# Patient Record
Sex: FEMALE | Race: WHITE | NOT HISPANIC OR LATINO | Employment: FULL TIME | URBAN - METROPOLITAN AREA
[De-identification: names, ages, dates, MRNs, and addresses within clinical notes are randomized per-mention and may not be internally consistent; named-entity substitution may affect disease eponyms.]

---

## 2017-04-25 ENCOUNTER — HOSPITAL ENCOUNTER (EMERGENCY)
Facility: HOSPITAL | Age: 47
Discharge: HOME | End: 2017-04-25
Attending: EMERGENCY MEDICINE
Payer: COMMERCIAL

## 2017-04-25 ENCOUNTER — APPOINTMENT (EMERGENCY)
Dept: CT IMAGING | Facility: HOSPITAL | Age: 47
End: 2017-04-25
Payer: COMMERCIAL

## 2017-04-25 ENCOUNTER — APPOINTMENT (EMERGENCY)
Dept: ULTRASOUND IMAGING | Facility: HOSPITAL | Age: 47
End: 2017-04-25
Payer: COMMERCIAL

## 2017-04-25 VITALS
RESPIRATION RATE: 18 BRPM | WEIGHT: 148 LBS | OXYGEN SATURATION: 97 % | SYSTOLIC BLOOD PRESSURE: 119 MMHG | TEMPERATURE: 97.6 F | DIASTOLIC BLOOD PRESSURE: 59 MMHG | HEART RATE: 45 BPM

## 2017-04-25 DIAGNOSIS — N13.30 HYDROURETERONEPHROSIS: Primary | ICD-10-CM

## 2017-04-25 LAB
ALBUMIN SERPL BCP-MCNC: 3.7 G/DL (ref 3.5–5)
ALP SERPL-CCNC: 64 U/L (ref 46–116)
ALT SERPL W P-5'-P-CCNC: 19 U/L (ref 12–78)
ANION GAP SERPL CALCULATED.3IONS-SCNC: 9 MMOL/L (ref 4–13)
AST SERPL W P-5'-P-CCNC: 19 U/L (ref 5–45)
BASOPHILS # BLD AUTO: 0.1 THOUSANDS/ΜL (ref 0–0.1)
BASOPHILS NFR BLD AUTO: 2 % (ref 0–1)
BILIRUB SERPL-MCNC: 0.2 MG/DL (ref 0.2–1)
BUN SERPL-MCNC: 17 MG/DL (ref 5–25)
CALCIUM SERPL-MCNC: 9 MG/DL (ref 8.3–10.1)
CHLORIDE SERPL-SCNC: 105 MMOL/L (ref 100–108)
CLARITY, POC: CLEAR
CO2 SERPL-SCNC: 25 MMOL/L (ref 21–32)
COLOR, POC: YELLOW
CREAT SERPL-MCNC: 0.97 MG/DL (ref 0.6–1.3)
EOSINOPHIL # BLD AUTO: 0.39 THOUSAND/ΜL (ref 0–0.61)
EOSINOPHIL NFR BLD AUTO: 6 % (ref 0–6)
ERYTHROCYTE [DISTWIDTH] IN BLOOD BY AUTOMATED COUNT: 12.3 % (ref 11.6–15.1)
EXT BILIRUBIN, UA: NEGATIVE
EXT BLOOD URINE: NEGATIVE
EXT GLUCOSE, UA: NEGATIVE
EXT KETONES: NEGATIVE
EXT NITRITE, UA: NEGATIVE
EXT PH, UA: 6
EXT PROTEIN, UA: NEGATIVE
EXT SPECIFIC GRAVITY, UA: 1.01
EXT UROBILINOGEN: NEGATIVE
GFR SERPL CREATININE-BSD FRML MDRD: >60 ML/MIN/1.73SQ M
GLUCOSE SERPL-MCNC: 96 MG/DL (ref 65–140)
HCG UR QL: NEGATIVE
HCT VFR BLD AUTO: 37.7 % (ref 34.8–46.1)
HGB BLD-MCNC: 12.5 G/DL (ref 11.5–15.4)
LIPASE SERPL-CCNC: 133 U/L (ref 73–393)
LYMPHOCYTES # BLD AUTO: 2.23 THOUSANDS/ΜL (ref 0.6–4.47)
LYMPHOCYTES NFR BLD AUTO: 36 % (ref 14–44)
MCH RBC QN AUTO: 30.9 PG (ref 26.8–34.3)
MCHC RBC AUTO-ENTMCNC: 33.2 G/DL (ref 31.4–37.4)
MCV RBC AUTO: 93 FL (ref 82–98)
MONOCYTES # BLD AUTO: 0.39 THOUSAND/ΜL (ref 0.17–1.22)
MONOCYTES NFR BLD AUTO: 6 % (ref 4–12)
NEUTROPHILS # BLD AUTO: 3.18 THOUSANDS/ΜL (ref 1.85–7.62)
NEUTS SEG NFR BLD AUTO: 50 % (ref 43–75)
PLATELET # BLD AUTO: 179 THOUSANDS/UL (ref 149–390)
PMV BLD AUTO: 11 FL (ref 8.9–12.7)
POTASSIUM SERPL-SCNC: 3.8 MMOL/L (ref 3.5–5.3)
PROT SERPL-MCNC: 6.8 G/DL (ref 6.4–8.2)
RBC # BLD AUTO: 4.05 MILLION/UL (ref 3.81–5.12)
SODIUM SERPL-SCNC: 139 MMOL/L (ref 136–145)
WBC # BLD AUTO: 6.29 THOUSAND/UL (ref 4.31–10.16)
WBC # BLD EST: NEGATIVE 10*3/UL

## 2017-04-25 PROCEDURE — 76856 US EXAM PELVIC COMPLETE: CPT

## 2017-04-25 PROCEDURE — 80053 COMPREHEN METABOLIC PANEL: CPT | Performed by: PHYSICIAN ASSISTANT

## 2017-04-25 PROCEDURE — 85025 COMPLETE CBC W/AUTO DIFF WBC: CPT | Performed by: PHYSICIAN ASSISTANT

## 2017-04-25 PROCEDURE — 96374 THER/PROPH/DIAG INJ IV PUSH: CPT

## 2017-04-25 PROCEDURE — 83690 ASSAY OF LIPASE: CPT | Performed by: PHYSICIAN ASSISTANT

## 2017-04-25 PROCEDURE — 74177 CT ABD & PELVIS W/CONTRAST: CPT

## 2017-04-25 PROCEDURE — 81025 URINE PREGNANCY TEST: CPT | Performed by: PHYSICIAN ASSISTANT

## 2017-04-25 PROCEDURE — 99284 EMERGENCY DEPT VISIT MOD MDM: CPT

## 2017-04-25 PROCEDURE — 76830 TRANSVAGINAL US NON-OB: CPT

## 2017-04-25 PROCEDURE — 96375 TX/PRO/DX INJ NEW DRUG ADDON: CPT

## 2017-04-25 PROCEDURE — 36415 COLL VENOUS BLD VENIPUNCTURE: CPT | Performed by: PHYSICIAN ASSISTANT

## 2017-04-25 PROCEDURE — 81002 URINALYSIS NONAUTO W/O SCOPE: CPT | Performed by: PHYSICIAN ASSISTANT

## 2017-04-25 PROCEDURE — 96361 HYDRATE IV INFUSION ADD-ON: CPT

## 2017-04-25 RX ORDER — DICYCLOMINE HCL 20 MG
20 TABLET ORAL ONCE
Status: DISCONTINUED | OUTPATIENT
Start: 2017-04-25 | End: 2017-04-25

## 2017-04-25 RX ORDER — KETOROLAC TROMETHAMINE 30 MG/ML
30 INJECTION, SOLUTION INTRAMUSCULAR; INTRAVENOUS ONCE
Status: COMPLETED | OUTPATIENT
Start: 2017-04-25 | End: 2017-04-25

## 2017-04-25 RX ORDER — DICYCLOMINE HCL 20 MG
20 TABLET ORAL ONCE
Status: COMPLETED | OUTPATIENT
Start: 2017-04-25 | End: 2017-04-25

## 2017-04-25 RX ORDER — ONDANSETRON 2 MG/ML
4 INJECTION INTRAMUSCULAR; INTRAVENOUS ONCE
Status: COMPLETED | OUTPATIENT
Start: 2017-04-25 | End: 2017-04-25

## 2017-04-25 RX ORDER — NAPROXEN 500 MG/1
500 TABLET ORAL 2 TIMES DAILY WITH MEALS
Qty: 5 TABLET | Refills: 0 | Status: SHIPPED | OUTPATIENT
Start: 2017-04-25 | End: 2017-05-08 | Stop reason: ALTCHOICE

## 2017-04-25 RX ORDER — HYDROCODONE BITARTRATE AND ACETAMINOPHEN 5; 325 MG/1; MG/1
1 TABLET ORAL EVERY 6 HOURS PRN
Qty: 12 TABLET | Refills: 0 | Status: SHIPPED | OUTPATIENT
Start: 2017-04-25 | End: 2017-04-28

## 2017-04-25 RX ADMIN — DICYCLOMINE HYDROCHLORIDE 20 MG: 20 TABLET ORAL at 10:48

## 2017-04-25 RX ADMIN — ONDANSETRON 4 MG: 2 INJECTION INTRAMUSCULAR; INTRAVENOUS at 10:44

## 2017-04-25 RX ADMIN — KETOROLAC TROMETHAMINE 30 MG: 30 INJECTION, SOLUTION INTRAMUSCULAR at 10:47

## 2017-04-25 RX ADMIN — SODIUM CHLORIDE 1000 ML: 0.9 INJECTION, SOLUTION INTRAVENOUS at 12:00

## 2017-04-25 RX ADMIN — IOHEXOL 100 ML: 350 INJECTION, SOLUTION INTRAVENOUS at 12:24

## 2017-04-28 ENCOUNTER — ALLSCRIPTS OFFICE VISIT (OUTPATIENT)
Dept: OTHER | Facility: OTHER | Age: 47
End: 2017-04-28

## 2017-04-28 LAB
CLARITY UR: NORMAL
COLOR UR: YELLOW
GLUCOSE (HISTORICAL): NORMAL
HGB UR QL STRIP.AUTO: NORMAL
KETONES UR STRIP-MCNC: NORMAL MG/DL
LEUKOCYTE ESTERASE UR QL STRIP: NORMAL
NITRITE UR QL STRIP: NORMAL
PH UR STRIP.AUTO: 6 [PH]
PROT UR STRIP-MCNC: NORMAL MG/DL
SP GR UR STRIP.AUTO: 1.01

## 2017-05-03 ENCOUNTER — GENERIC CONVERSION - ENCOUNTER (OUTPATIENT)
Dept: OTHER | Facility: OTHER | Age: 47
End: 2017-05-03

## 2017-05-09 ENCOUNTER — ANESTHESIA (OUTPATIENT)
Dept: PERIOP | Facility: HOSPITAL | Age: 47
End: 2017-05-09
Payer: COMMERCIAL

## 2017-05-09 ENCOUNTER — HOSPITAL ENCOUNTER (OUTPATIENT)
Dept: RADIOLOGY | Facility: HOSPITAL | Age: 47
Setting detail: OUTPATIENT SURGERY
Discharge: HOME | End: 2017-05-09
Payer: COMMERCIAL

## 2017-05-09 ENCOUNTER — ANESTHESIA EVENT (OUTPATIENT)
Dept: PERIOP | Facility: HOSPITAL | Age: 47
End: 2017-05-09
Payer: COMMERCIAL

## 2017-05-09 ENCOUNTER — HOSPITAL ENCOUNTER (OUTPATIENT)
Facility: HOSPITAL | Age: 47
Setting detail: OUTPATIENT SURGERY
Discharge: HOME | End: 2017-05-09
Attending: UROLOGY | Admitting: UROLOGY
Payer: COMMERCIAL

## 2017-05-09 ENCOUNTER — APPOINTMENT (OUTPATIENT)
Dept: RADIOLOGY | Facility: HOSPITAL | Age: 47
End: 2017-05-09
Payer: COMMERCIAL

## 2017-05-09 VITALS
OXYGEN SATURATION: 99 % | BODY MASS INDEX: 24.66 KG/M2 | RESPIRATION RATE: 16 BRPM | DIASTOLIC BLOOD PRESSURE: 64 MMHG | HEART RATE: 62 BPM | HEIGHT: 65 IN | WEIGHT: 148 LBS | TEMPERATURE: 98.8 F | SYSTOLIC BLOOD PRESSURE: 109 MMHG

## 2017-05-09 DIAGNOSIS — N20.0 RENAL CALCULUS: ICD-10-CM

## 2017-05-09 PROCEDURE — C1769 GUIDE WIRE: HCPCS | Performed by: UROLOGY

## 2017-05-09 PROCEDURE — 74420 UROGRAPHY RTRGR +-KUB: CPT

## 2017-05-09 PROCEDURE — C2617 STENT, NON-COR, TEM W/O DEL: HCPCS | Performed by: UROLOGY

## 2017-05-09 DEVICE — STENT URETERAL 6 FR 26CM INLAY OPTIMA: Type: IMPLANTABLE DEVICE | Site: URETER | Status: FUNCTIONAL

## 2017-05-09 RX ORDER — METOCLOPRAMIDE HYDROCHLORIDE 5 MG/ML
10 INJECTION INTRAMUSCULAR; INTRAVENOUS ONCE
Status: COMPLETED | OUTPATIENT
Start: 2017-05-09 | End: 2017-05-09

## 2017-05-09 RX ORDER — SODIUM CHLORIDE, SODIUM LACTATE, POTASSIUM CHLORIDE, CALCIUM CHLORIDE 600; 310; 30; 20 MG/100ML; MG/100ML; MG/100ML; MG/100ML
INJECTION, SOLUTION INTRAVENOUS CONTINUOUS PRN
Status: DISCONTINUED | OUTPATIENT
Start: 2017-05-09 | End: 2017-05-09

## 2017-05-09 RX ORDER — MAGNESIUM HYDROXIDE 1200 MG/15ML
LIQUID ORAL AS NEEDED
Status: DISCONTINUED | OUTPATIENT
Start: 2017-05-09 | End: 2017-05-09 | Stop reason: HOSPADM

## 2017-05-09 RX ORDER — LIDOCAINE HYDROCHLORIDE 10 MG/ML
INJECTION, SOLUTION INFILTRATION; PERINEURAL AS NEEDED
Status: DISCONTINUED | OUTPATIENT
Start: 2017-05-09 | End: 2017-05-09 | Stop reason: SURG

## 2017-05-09 RX ORDER — CIPROFLOXACIN 500 MG/1
500 TABLET, FILM COATED ORAL 2 TIMES DAILY
Qty: 10 TABLET | Refills: 0 | Status: SHIPPED | OUTPATIENT
Start: 2017-05-09 | End: 2017-05-12

## 2017-05-09 RX ORDER — FENTANYL CITRATE/PF 50 MCG/ML
25 SYRINGE (ML) INJECTION
Status: DISCONTINUED | OUTPATIENT
Start: 2017-05-09 | End: 2017-05-09 | Stop reason: HOSPADM

## 2017-05-09 RX ORDER — HYDROCODONE BITARTRATE AND ACETAMINOPHEN 5; 325 MG/1; MG/1
1 TABLET ORAL EVERY 6 HOURS PRN
Status: DISCONTINUED | OUTPATIENT
Start: 2017-05-09 | End: 2017-05-09 | Stop reason: HOSPADM

## 2017-05-09 RX ORDER — ONDANSETRON 2 MG/ML
INJECTION INTRAMUSCULAR; INTRAVENOUS AS NEEDED
Status: DISCONTINUED | OUTPATIENT
Start: 2017-05-09 | End: 2017-05-09 | Stop reason: SURG

## 2017-05-09 RX ORDER — DIPHENHYDRAMINE HYDROCHLORIDE 50 MG/ML
INJECTION INTRAMUSCULAR; INTRAVENOUS AS NEEDED
Status: DISCONTINUED | OUTPATIENT
Start: 2017-05-09 | End: 2017-05-09 | Stop reason: SURG

## 2017-05-09 RX ORDER — CIPROFLOXACIN 2 MG/ML
400 INJECTION, SOLUTION INTRAVENOUS ONCE
Status: COMPLETED | OUTPATIENT
Start: 2017-05-09 | End: 2017-05-09

## 2017-05-09 RX ORDER — IBUPROFEN 200 MG
200 TABLET ORAL EVERY 6 HOURS PRN
COMMUNITY
End: 2017-05-26 | Stop reason: HOSPADM

## 2017-05-09 RX ORDER — PROPOFOL 10 MG/ML
INJECTION, EMULSION INTRAVENOUS AS NEEDED
Status: DISCONTINUED | OUTPATIENT
Start: 2017-05-09 | End: 2017-05-09 | Stop reason: SURG

## 2017-05-09 RX ORDER — SODIUM CHLORIDE, SODIUM LACTATE, POTASSIUM CHLORIDE, CALCIUM CHLORIDE 600; 310; 30; 20 MG/100ML; MG/100ML; MG/100ML; MG/100ML
20 INJECTION, SOLUTION INTRAVENOUS CONTINUOUS
Status: DISCONTINUED | OUTPATIENT
Start: 2017-05-09 | End: 2017-05-09 | Stop reason: HOSPADM

## 2017-05-09 RX ORDER — FENTANYL CITRATE 50 UG/ML
INJECTION, SOLUTION INTRAMUSCULAR; INTRAVENOUS AS NEEDED
Status: DISCONTINUED | OUTPATIENT
Start: 2017-05-09 | End: 2017-05-09 | Stop reason: SURG

## 2017-05-09 RX ORDER — KETOROLAC TROMETHAMINE 30 MG/ML
INJECTION, SOLUTION INTRAMUSCULAR; INTRAVENOUS AS NEEDED
Status: DISCONTINUED | OUTPATIENT
Start: 2017-05-09 | End: 2017-05-09 | Stop reason: SURG

## 2017-05-09 RX ADMIN — CIPROFLOXACIN: 2 INJECTION INTRAVENOUS at 13:43

## 2017-05-09 RX ADMIN — DIPHENHYDRAMINE HYDROCHLORIDE 12.5 MG: 50 INJECTION, SOLUTION INTRAMUSCULAR; INTRAVENOUS at 14:10

## 2017-05-09 RX ADMIN — FENTANYL CITRATE 25 MCG: 50 INJECTION INTRAMUSCULAR; INTRAVENOUS at 14:48

## 2017-05-09 RX ADMIN — METOCLOPRAMIDE 10 MG: 5 INJECTION, SOLUTION INTRAMUSCULAR; INTRAVENOUS at 14:43

## 2017-05-09 RX ADMIN — FENTANYL CITRATE 25 MCG: 50 INJECTION INTRAMUSCULAR; INTRAVENOUS at 14:43

## 2017-05-09 RX ADMIN — KETOROLAC TROMETHAMINE 30 MG: 30 INJECTION, SOLUTION INTRAMUSCULAR at 14:19

## 2017-05-09 RX ADMIN — ONDANSETRON 4 MG: 2 INJECTION INTRAMUSCULAR; INTRAVENOUS at 13:46

## 2017-05-09 RX ADMIN — SODIUM CHLORIDE, SODIUM LACTATE, POTASSIUM CHLORIDE, AND CALCIUM CHLORIDE 20 ML/HR: .6; .31; .03; .02 INJECTION, SOLUTION INTRAVENOUS at 12:28

## 2017-05-09 RX ADMIN — DEXAMETHASONE SODIUM PHOSPHATE 5 MG: 10 INJECTION INTRAMUSCULAR; INTRAVENOUS at 14:16

## 2017-05-09 RX ADMIN — FENTANYL CITRATE 25 MCG: 50 INJECTION, SOLUTION INTRAMUSCULAR; INTRAVENOUS at 13:44

## 2017-05-09 RX ADMIN — PROPOFOL 200 MG: 10 INJECTION, EMULSION INTRAVENOUS at 13:49

## 2017-05-09 RX ADMIN — LIDOCAINE HYDROCHLORIDE 50 MG: 10 INJECTION, SOLUTION INFILTRATION; PERINEURAL at 13:49

## 2017-05-09 RX ADMIN — HYDROCODONE BITARTRATE AND ACETAMINOPHEN 1 TABLET: 5; 325 TABLET ORAL at 15:41

## 2017-05-12 ENCOUNTER — ALLSCRIPTS OFFICE VISIT (OUTPATIENT)
Dept: OTHER | Facility: OTHER | Age: 47
End: 2017-05-12

## 2017-05-12 LAB
CLARITY UR: NORMAL
COLOR UR: NORMAL
GLUCOSE (HISTORICAL): NORMAL
HGB UR QL STRIP.AUTO: NORMAL
KETONES UR STRIP-MCNC: NORMAL MG/DL
LEUKOCYTE ESTERASE UR QL STRIP: NORMAL
NITRITE UR QL STRIP: NORMAL
PH UR STRIP.AUTO: 5 [PH]
PROT UR STRIP-MCNC: NORMAL MG/DL
SP GR UR STRIP.AUTO: 1.02

## 2017-05-18 ENCOUNTER — HOSPITAL ENCOUNTER (OUTPATIENT)
Dept: RADIOLOGY | Facility: CLINIC | Age: 47
Discharge: HOME | End: 2017-05-18
Payer: COMMERCIAL

## 2017-05-18 ENCOUNTER — APPOINTMENT (OUTPATIENT)
Dept: LAB | Facility: CLINIC | Age: 47
End: 2017-05-18
Payer: COMMERCIAL

## 2017-05-18 ENCOUNTER — OFFICE VISIT (OUTPATIENT)
Dept: LAB | Facility: CLINIC | Age: 47
End: 2017-05-18
Payer: COMMERCIAL

## 2017-05-18 ENCOUNTER — APPOINTMENT (OUTPATIENT)
Dept: PREADMISSION TESTING | Facility: HOSPITAL | Age: 47
End: 2017-05-18
Payer: COMMERCIAL

## 2017-05-18 ENCOUNTER — TRANSCRIBE ORDERS (OUTPATIENT)
Dept: LAB | Facility: CLINIC | Age: 47
End: 2017-05-18

## 2017-05-18 VITALS
HEART RATE: 58 BPM | HEIGHT: 65 IN | WEIGHT: 148 LBS | BODY MASS INDEX: 24.66 KG/M2 | DIASTOLIC BLOOD PRESSURE: 61 MMHG | SYSTOLIC BLOOD PRESSURE: 109 MMHG | RESPIRATION RATE: 18 BRPM | TEMPERATURE: 97.8 F

## 2017-05-18 DIAGNOSIS — N13.30 HYDRONEPHROSIS, UNSPECIFIED HYDRONEPHROSIS TYPE: ICD-10-CM

## 2017-05-18 DIAGNOSIS — N13.30 HYDRONEPHROSIS, UNSPECIFIED HYDRONEPHROSIS TYPE: Primary | ICD-10-CM

## 2017-05-18 LAB
ABO GROUP BLD: NORMAL
ANION GAP SERPL CALCULATED.3IONS-SCNC: 7 MMOL/L (ref 4–13)
APTT PPP: 29 SECONDS (ref 23–35)
BASOPHILS # BLD AUTO: 0.07 THOUSANDS/ΜL (ref 0–0.1)
BASOPHILS NFR BLD AUTO: 1 % (ref 0–1)
BLD GP AB SCN SERPL QL: NEGATIVE
BUN SERPL-MCNC: 15 MG/DL (ref 5–25)
CALCIUM SERPL-MCNC: 8.8 MG/DL (ref 8.3–10.1)
CHLORIDE SERPL-SCNC: 105 MMOL/L (ref 100–108)
CO2 SERPL-SCNC: 28 MMOL/L (ref 21–32)
CREAT SERPL-MCNC: 0.83 MG/DL (ref 0.6–1.3)
EOSINOPHIL # BLD AUTO: 0.36 THOUSAND/ΜL (ref 0–0.61)
EOSINOPHIL NFR BLD AUTO: 6 % (ref 0–6)
ERYTHROCYTE [DISTWIDTH] IN BLOOD BY AUTOMATED COUNT: 12.2 % (ref 11.6–15.1)
GFR SERPL CREATININE-BSD FRML MDRD: >60 ML/MIN/1.73SQ M
GLUCOSE SERPL-MCNC: 91 MG/DL (ref 65–140)
HCT VFR BLD AUTO: 40.5 % (ref 34.8–46.1)
HGB BLD-MCNC: 13.6 G/DL (ref 11.5–15.4)
INR PPP: 0.9 (ref 0.86–1.16)
LYMPHOCYTES # BLD AUTO: 2.28 THOUSANDS/ΜL (ref 0.6–4.47)
LYMPHOCYTES NFR BLD AUTO: 40 % (ref 14–44)
MCH RBC QN AUTO: 30.9 PG (ref 26.8–34.3)
MCHC RBC AUTO-ENTMCNC: 33.6 G/DL (ref 31.4–37.4)
MCV RBC AUTO: 92 FL (ref 82–98)
MONOCYTES # BLD AUTO: 0.41 THOUSAND/ΜL (ref 0.17–1.22)
MONOCYTES NFR BLD AUTO: 7 % (ref 4–12)
NEUTROPHILS # BLD AUTO: 2.65 THOUSANDS/ΜL (ref 1.85–7.62)
NEUTS SEG NFR BLD AUTO: 46 % (ref 43–75)
PLATELET # BLD AUTO: 230 THOUSANDS/UL (ref 149–390)
PMV BLD AUTO: 10.3 FL (ref 8.9–12.7)
POTASSIUM SERPL-SCNC: 4.4 MMOL/L (ref 3.5–5.3)
PROTHROMBIN TIME: 12.4 SECONDS (ref 12.1–14.4)
RBC # BLD AUTO: 4.4 MILLION/UL (ref 3.81–5.12)
RH BLD: POSITIVE
SODIUM SERPL-SCNC: 140 MMOL/L (ref 136–145)
SPECIMEN EXPIRATION DATE: NORMAL
WBC # BLD AUTO: 5.77 THOUSAND/UL (ref 4.31–10.16)

## 2017-05-18 PROCEDURE — 93005 ELECTROCARDIOGRAM TRACING: CPT

## 2017-05-18 PROCEDURE — 86850 RBC ANTIBODY SCREEN: CPT

## 2017-05-18 PROCEDURE — 71020 HB CHEST X-RAY 2VW FRONTAL&LATL: CPT

## 2017-05-18 PROCEDURE — 36415 COLL VENOUS BLD VENIPUNCTURE: CPT

## 2017-05-18 PROCEDURE — 85730 THROMBOPLASTIN TIME PARTIAL: CPT

## 2017-05-18 PROCEDURE — 85610 PROTHROMBIN TIME: CPT

## 2017-05-18 PROCEDURE — 86900 BLOOD TYPING SEROLOGIC ABO: CPT

## 2017-05-18 PROCEDURE — 87086 URINE CULTURE/COLONY COUNT: CPT

## 2017-05-18 PROCEDURE — 80048 BASIC METABOLIC PNL TOTAL CA: CPT

## 2017-05-18 PROCEDURE — 86901 BLOOD TYPING SEROLOGIC RH(D): CPT

## 2017-05-18 PROCEDURE — 85025 COMPLETE CBC W/AUTO DIFF WBC: CPT

## 2017-05-19 LAB
ATRIAL RATE: 61 BPM
BACTERIA UR CULT: NORMAL
P AXIS: 13 DEGREES
PR INTERVAL: 212 MS
QRS AXIS: 20 DEGREES
QRSD INTERVAL: 80 MS
QT INTERVAL: 430 MS
QTC INTERVAL: 432 MS
T WAVE AXIS: 14 DEGREES
VENTRICULAR RATE: 61 BPM

## 2017-05-23 ENCOUNTER — ANESTHESIA EVENT (OUTPATIENT)
Dept: PERIOP | Facility: HOSPITAL | Age: 47
DRG: 660 | End: 2017-05-23
Payer: COMMERCIAL

## 2017-05-24 ENCOUNTER — ANESTHESIA (OUTPATIENT)
Dept: PERIOP | Facility: HOSPITAL | Age: 47
DRG: 660 | End: 2017-05-24
Payer: COMMERCIAL

## 2017-05-24 ENCOUNTER — HOSPITAL ENCOUNTER (INPATIENT)
Facility: HOSPITAL | Age: 47
LOS: 2 days | Discharge: HOME | DRG: 660 | End: 2017-05-26
Attending: UROLOGY | Admitting: UROLOGY
Payer: COMMERCIAL

## 2017-05-24 LAB
ABO GROUP BLD: NORMAL
ANION GAP SERPL CALCULATED.3IONS-SCNC: 8 MMOL/L (ref 4–13)
BLD GP AB SCN SERPL QL: NEGATIVE
BUN SERPL-MCNC: 9 MG/DL (ref 5–25)
CALCIUM SERPL-MCNC: 8.2 MG/DL (ref 8.3–10.1)
CHLORIDE SERPL-SCNC: 107 MMOL/L (ref 100–108)
CO2 SERPL-SCNC: 25 MMOL/L (ref 21–32)
CREAT SERPL-MCNC: 0.94 MG/DL (ref 0.6–1.3)
ERYTHROCYTE [DISTWIDTH] IN BLOOD BY AUTOMATED COUNT: 11.8 % (ref 11.6–15.1)
GFR SERPL CREATININE-BSD FRML MDRD: >60 ML/MIN/1.73SQ M
GLUCOSE SERPL-MCNC: 134 MG/DL (ref 65–140)
HCT VFR BLD AUTO: 38 % (ref 34.8–46.1)
HGB BLD-MCNC: 12.7 G/DL (ref 11.5–15.4)
MCH RBC QN AUTO: 30.8 PG (ref 26.8–34.3)
MCHC RBC AUTO-ENTMCNC: 33.4 G/DL (ref 31.4–37.4)
MCV RBC AUTO: 92 FL (ref 82–98)
PLATELET # BLD AUTO: 196 THOUSANDS/UL (ref 149–390)
PMV BLD AUTO: 10.1 FL (ref 8.9–12.7)
POTASSIUM SERPL-SCNC: 3.7 MMOL/L (ref 3.5–5.3)
RBC # BLD AUTO: 4.13 MILLION/UL (ref 3.81–5.12)
RH BLD: POSITIVE
SODIUM SERPL-SCNC: 140 MMOL/L (ref 136–145)
SPECIMEN EXPIRATION DATE: NORMAL
WBC # BLD AUTO: 15.89 THOUSAND/UL (ref 4.31–10.16)

## 2017-05-24 PROCEDURE — 80048 BASIC METABOLIC PNL TOTAL CA: CPT | Performed by: UROLOGY

## 2017-05-24 PROCEDURE — 94762 N-INVAS EAR/PLS OXIMTRY CONT: CPT

## 2017-05-24 PROCEDURE — 86900 BLOOD TYPING SEROLOGIC ABO: CPT | Performed by: UROLOGY

## 2017-05-24 PROCEDURE — 86923 COMPATIBILITY TEST ELECTRIC: CPT

## 2017-05-24 PROCEDURE — 94760 N-INVAS EAR/PLS OXIMETRY 1: CPT

## 2017-05-24 PROCEDURE — 0T770DZ DILATION OF LEFT URETER WITH INTRALUMINAL DEVICE, OPEN APPROACH: ICD-10-PCS | Performed by: UROLOGY

## 2017-05-24 PROCEDURE — 85027 COMPLETE CBC AUTOMATED: CPT | Performed by: UROLOGY

## 2017-05-24 PROCEDURE — 0T170ZB BYPASS LEFT URETER TO BLADDER, OPEN APPROACH: ICD-10-PCS | Performed by: UROLOGY

## 2017-05-24 PROCEDURE — 86901 BLOOD TYPING SEROLOGIC RH(D): CPT | Performed by: UROLOGY

## 2017-05-24 PROCEDURE — 86850 RBC ANTIBODY SCREEN: CPT | Performed by: UROLOGY

## 2017-05-24 RX ORDER — ALBUMIN, HUMAN INJ 5% 5 %
SOLUTION INTRAVENOUS CONTINUOUS PRN
Status: DISCONTINUED | OUTPATIENT
Start: 2017-05-24 | End: 2017-05-24 | Stop reason: SURG

## 2017-05-24 RX ORDER — DOCUSATE SODIUM 100 MG/1
100 CAPSULE, LIQUID FILLED ORAL 2 TIMES DAILY
Status: DISCONTINUED | OUTPATIENT
Start: 2017-05-24 | End: 2017-05-26 | Stop reason: HOSPADM

## 2017-05-24 RX ORDER — FENTANYL CITRATE/PF 50 MCG/ML
25 SYRINGE (ML) INJECTION
Status: COMPLETED | OUTPATIENT
Start: 2017-05-24 | End: 2017-05-24

## 2017-05-24 RX ORDER — CALCIUM CARBONATE 200(500)MG
1000 TABLET,CHEWABLE ORAL DAILY PRN
Status: DISCONTINUED | OUTPATIENT
Start: 2017-05-24 | End: 2017-05-26 | Stop reason: HOSPADM

## 2017-05-24 RX ORDER — ROCURONIUM BROMIDE 10 MG/ML
INJECTION, SOLUTION INTRAVENOUS AS NEEDED
Status: DISCONTINUED | OUTPATIENT
Start: 2017-05-24 | End: 2017-05-24 | Stop reason: SURG

## 2017-05-24 RX ORDER — MAGNESIUM HYDROXIDE 1200 MG/15ML
LIQUID ORAL AS NEEDED
Status: DISCONTINUED | OUTPATIENT
Start: 2017-05-24 | End: 2017-05-24 | Stop reason: HOSPADM

## 2017-05-24 RX ORDER — FENTANYL CITRATE 50 UG/ML
INJECTION, SOLUTION INTRAMUSCULAR; INTRAVENOUS AS NEEDED
Status: DISCONTINUED | OUTPATIENT
Start: 2017-05-24 | End: 2017-05-24 | Stop reason: SURG

## 2017-05-24 RX ORDER — LEVOFLOXACIN 5 MG/ML
500 INJECTION, SOLUTION INTRAVENOUS
Status: COMPLETED | OUTPATIENT
Start: 2017-05-24 | End: 2017-05-24

## 2017-05-24 RX ORDER — KETOROLAC TROMETHAMINE 30 MG/ML
15 INJECTION, SOLUTION INTRAMUSCULAR; INTRAVENOUS EVERY 6 HOURS SCHEDULED
Status: DISCONTINUED | OUTPATIENT
Start: 2017-05-24 | End: 2017-05-26 | Stop reason: HOSPADM

## 2017-05-24 RX ORDER — SODIUM CHLORIDE, SODIUM LACTATE, POTASSIUM CHLORIDE, CALCIUM CHLORIDE 600; 310; 30; 20 MG/100ML; MG/100ML; MG/100ML; MG/100ML
100 INJECTION, SOLUTION INTRAVENOUS CONTINUOUS
Status: DISCONTINUED | OUTPATIENT
Start: 2017-05-24 | End: 2017-05-24

## 2017-05-24 RX ORDER — ONDANSETRON 2 MG/ML
4 INJECTION INTRAMUSCULAR; INTRAVENOUS EVERY 6 HOURS PRN
Status: DISCONTINUED | OUTPATIENT
Start: 2017-05-24 | End: 2017-05-26 | Stop reason: HOSPADM

## 2017-05-24 RX ORDER — SODIUM CHLORIDE, SODIUM LACTATE, POTASSIUM CHLORIDE, CALCIUM CHLORIDE 600; 310; 30; 20 MG/100ML; MG/100ML; MG/100ML; MG/100ML
INJECTION, SOLUTION INTRAVENOUS CONTINUOUS PRN
Status: DISCONTINUED | OUTPATIENT
Start: 2017-05-24 | End: 2017-05-24

## 2017-05-24 RX ORDER — DEXTROSE, SODIUM CHLORIDE, SODIUM LACTATE, POTASSIUM CHLORIDE, AND CALCIUM CHLORIDE 5; .6; .31; .03; .02 G/100ML; G/100ML; G/100ML; G/100ML; G/100ML
125 INJECTION, SOLUTION INTRAVENOUS CONTINUOUS
Status: DISCONTINUED | OUTPATIENT
Start: 2017-05-24 | End: 2017-05-26 | Stop reason: HOSPADM

## 2017-05-24 RX ORDER — MIDAZOLAM HYDROCHLORIDE 1 MG/ML
INJECTION INTRAMUSCULAR; INTRAVENOUS AS NEEDED
Status: DISCONTINUED | OUTPATIENT
Start: 2017-05-24 | End: 2017-05-24 | Stop reason: SURG

## 2017-05-24 RX ORDER — ONDANSETRON 2 MG/ML
INJECTION INTRAMUSCULAR; INTRAVENOUS AS NEEDED
Status: DISCONTINUED | OUTPATIENT
Start: 2017-05-24 | End: 2017-05-24 | Stop reason: SURG

## 2017-05-24 RX ORDER — HYDROMORPHONE HYDROCHLORIDE 2 MG/ML
INJECTION, SOLUTION INTRAMUSCULAR; INTRAVENOUS; SUBCUTANEOUS AS NEEDED
Status: DISCONTINUED | OUTPATIENT
Start: 2017-05-24 | End: 2017-05-24 | Stop reason: SURG

## 2017-05-24 RX ORDER — MORPHINE SULFATE 2 MG/ML
2 INJECTION, SOLUTION INTRAMUSCULAR; INTRAVENOUS
Status: DISCONTINUED | OUTPATIENT
Start: 2017-05-24 | End: 2017-05-26 | Stop reason: HOSPADM

## 2017-05-24 RX ORDER — PROCHLORPERAZINE MALEATE 10 MG
5 TABLET ORAL EVERY 6 HOURS PRN
Status: DISCONTINUED | OUTPATIENT
Start: 2017-05-24 | End: 2017-05-26 | Stop reason: HOSPADM

## 2017-05-24 RX ORDER — GLYCOPYRROLATE 0.2 MG/ML
INJECTION INTRAMUSCULAR; INTRAVENOUS AS NEEDED
Status: DISCONTINUED | OUTPATIENT
Start: 2017-05-24 | End: 2017-05-24 | Stop reason: SURG

## 2017-05-24 RX ORDER — PROPOFOL 10 MG/ML
INJECTION, EMULSION INTRAVENOUS AS NEEDED
Status: DISCONTINUED | OUTPATIENT
Start: 2017-05-24 | End: 2017-05-24 | Stop reason: SURG

## 2017-05-24 RX ORDER — LIDOCAINE HYDROCHLORIDE 10 MG/ML
INJECTION, SOLUTION INFILTRATION; PERINEURAL AS NEEDED
Status: DISCONTINUED | OUTPATIENT
Start: 2017-05-24 | End: 2017-05-24 | Stop reason: SURG

## 2017-05-24 RX ORDER — LEVOFLOXACIN 500 MG/1
500 TABLET, FILM COATED ORAL DAILY
Status: COMPLETED | OUTPATIENT
Start: 2017-05-25 | End: 2017-05-25

## 2017-05-24 RX ORDER — MEPERIDINE HYDROCHLORIDE 25 MG/ML
25 INJECTION INTRAMUSCULAR; INTRAVENOUS; SUBCUTANEOUS AS NEEDED
Status: DISCONTINUED | OUTPATIENT
Start: 2017-05-24 | End: 2017-05-24 | Stop reason: HOSPADM

## 2017-05-24 RX ORDER — HYDROCODONE BITARTRATE AND ACETAMINOPHEN 5; 325 MG/1; MG/1
1 TABLET ORAL EVERY 6 HOURS PRN
Status: DISCONTINUED | OUTPATIENT
Start: 2017-05-24 | End: 2017-05-26 | Stop reason: HOSPADM

## 2017-05-24 RX ADMIN — HYDROMORPHONE HYDROCHLORIDE 0.5 MG: 1 INJECTION, SOLUTION INTRAMUSCULAR; INTRAVENOUS; SUBCUTANEOUS at 16:42

## 2017-05-24 RX ADMIN — FENTANYL CITRATE 25 MCG: 50 INJECTION INTRAMUSCULAR; INTRAVENOUS at 16:23

## 2017-05-24 RX ADMIN — SODIUM CHLORIDE, SODIUM LACTATE, POTASSIUM CHLORIDE, AND CALCIUM CHLORIDE: .6; .31; .03; .02 INJECTION, SOLUTION INTRAVENOUS at 12:39

## 2017-05-24 RX ADMIN — KETOROLAC TROMETHAMINE 15 MG: 30 INJECTION, SOLUTION INTRAMUSCULAR at 17:51

## 2017-05-24 RX ADMIN — SODIUM CHLORIDE, SODIUM LACTATE, POTASSIUM CHLORIDE, AND CALCIUM CHLORIDE: .6; .31; .03; .02 INJECTION, SOLUTION INTRAVENOUS at 14:08

## 2017-05-24 RX ADMIN — GLYCOPYRROLATE 0.6 MG: 0.2 INJECTION INTRAMUSCULAR; INTRAVENOUS at 15:47

## 2017-05-24 RX ADMIN — MIDAZOLAM HYDROCHLORIDE 2 MG: 1 INJECTION, SOLUTION INTRAMUSCULAR; INTRAVENOUS at 12:44

## 2017-05-24 RX ADMIN — ROCURONIUM BROMIDE 50 MG: 10 INJECTION, SOLUTION INTRAVENOUS at 12:50

## 2017-05-24 RX ADMIN — FENTANYL CITRATE 25 MCG: 50 INJECTION INTRAMUSCULAR; INTRAVENOUS at 16:12

## 2017-05-24 RX ADMIN — HYDROMORPHONE HYDROCHLORIDE 0.5 MG: 1 INJECTION, SOLUTION INTRAMUSCULAR; INTRAVENOUS; SUBCUTANEOUS at 16:48

## 2017-05-24 RX ADMIN — ONDANSETRON 4 MG: 2 INJECTION INTRAMUSCULAR; INTRAVENOUS at 22:19

## 2017-05-24 RX ADMIN — HYDROMORPHONE HYDROCHLORIDE 0.5 MG: 1 INJECTION, SOLUTION INTRAMUSCULAR; INTRAVENOUS; SUBCUTANEOUS at 16:37

## 2017-05-24 RX ADMIN — FENTANYL CITRATE 100 MCG: 50 INJECTION INTRAMUSCULAR; INTRAVENOUS at 12:50

## 2017-05-24 RX ADMIN — HYDROMORPHONE HYDROCHLORIDE 0.5 MG: 2 INJECTION, SOLUTION INTRAMUSCULAR; INTRAVENOUS; SUBCUTANEOUS at 16:02

## 2017-05-24 RX ADMIN — FENTANYL CITRATE 50 MCG: 50 INJECTION INTRAMUSCULAR; INTRAVENOUS at 13:33

## 2017-05-24 RX ADMIN — PROPOFOL 150 MG: 10 INJECTION, EMULSION INTRAVENOUS at 12:50

## 2017-05-24 RX ADMIN — DEXAMETHASONE SODIUM PHOSPHATE 10 MG: 10 INJECTION INTRAMUSCULAR; INTRAVENOUS at 12:54

## 2017-05-24 RX ADMIN — DEXTROSE, SODIUM CHLORIDE, SODIUM LACTATE, POTASSIUM CHLORIDE, AND CALCIUM CHLORIDE 125 ML/HR: 5; .6; .31; .03; .02 INJECTION, SOLUTION INTRAVENOUS at 18:48

## 2017-05-24 RX ADMIN — LIDOCAINE HYDROCHLORIDE 50 MG: 10 INJECTION, SOLUTION INFILTRATION; PERINEURAL at 12:50

## 2017-05-24 RX ADMIN — HYDROMORPHONE HYDROCHLORIDE 0.5 MG: 1 INJECTION, SOLUTION INTRAMUSCULAR; INTRAVENOUS; SUBCUTANEOUS at 17:32

## 2017-05-24 RX ADMIN — FENTANYL CITRATE 25 MCG: 50 INJECTION INTRAMUSCULAR; INTRAVENOUS at 16:19

## 2017-05-24 RX ADMIN — PROCHLORPERAZINE MALEATE 5 MG: 10 TABLET, FILM COATED ORAL at 22:50

## 2017-05-24 RX ADMIN — ONDANSETRON 4 MG: 2 INJECTION INTRAMUSCULAR; INTRAVENOUS at 15:47

## 2017-05-24 RX ADMIN — ONDANSETRON 4 MG: 2 INJECTION INTRAMUSCULAR; INTRAVENOUS at 18:47

## 2017-05-24 RX ADMIN — FENTANYL CITRATE 25 MCG: 50 INJECTION INTRAMUSCULAR; INTRAVENOUS at 16:15

## 2017-05-24 RX ADMIN — FENTANYL CITRATE 50 MCG: 50 INJECTION INTRAMUSCULAR; INTRAVENOUS at 13:16

## 2017-05-24 RX ADMIN — Medication: at 18:08

## 2017-05-24 RX ADMIN — ALBUMIN HUMAN: 0.05 INJECTION, SOLUTION INTRAVENOUS at 14:09

## 2017-05-24 RX ADMIN — NEOSTIGMINE METHYLSULFATE 3 MG: 1 INJECTION, SOLUTION INTRAMUSCULAR; INTRAVENOUS; SUBCUTANEOUS at 15:47

## 2017-05-24 RX ADMIN — LEVOFLOXACIN: 5 INJECTION, SOLUTION INTRAVENOUS at 12:55

## 2017-05-24 RX ADMIN — HYDROMORPHONE HYDROCHLORIDE 0.5 MG: 2 INJECTION, SOLUTION INTRAMUSCULAR; INTRAVENOUS; SUBCUTANEOUS at 15:00

## 2017-05-25 LAB
ANION GAP SERPL CALCULATED.3IONS-SCNC: 6 MMOL/L (ref 4–13)
BUN SERPL-MCNC: 9 MG/DL (ref 5–25)
CALCIUM SERPL-MCNC: 8.3 MG/DL (ref 8.3–10.1)
CHLORIDE SERPL-SCNC: 105 MMOL/L (ref 100–108)
CO2 SERPL-SCNC: 26 MMOL/L (ref 21–32)
CREAT SERPL-MCNC: 0.71 MG/DL (ref 0.6–1.3)
ERYTHROCYTE [DISTWIDTH] IN BLOOD BY AUTOMATED COUNT: 12 % (ref 11.6–15.1)
GFR SERPL CREATININE-BSD FRML MDRD: >60 ML/MIN/1.73SQ M
GLUCOSE SERPL-MCNC: 148 MG/DL (ref 65–140)
HCT VFR BLD AUTO: 34.7 % (ref 34.8–46.1)
HGB BLD-MCNC: 11.2 G/DL (ref 11.5–15.4)
MCH RBC QN AUTO: 30.2 PG (ref 26.8–34.3)
MCHC RBC AUTO-ENTMCNC: 32.3 G/DL (ref 31.4–37.4)
MCV RBC AUTO: 94 FL (ref 82–98)
PLATELET # BLD AUTO: 193 THOUSANDS/UL (ref 149–390)
PMV BLD AUTO: 10.3 FL (ref 8.9–12.7)
POTASSIUM SERPL-SCNC: 4 MMOL/L (ref 3.5–5.3)
RBC # BLD AUTO: 3.71 MILLION/UL (ref 3.81–5.12)
SODIUM SERPL-SCNC: 137 MMOL/L (ref 136–145)
WBC # BLD AUTO: 9.93 THOUSAND/UL (ref 4.31–10.16)

## 2017-05-25 PROCEDURE — 85027 COMPLETE CBC AUTOMATED: CPT | Performed by: UROLOGY

## 2017-05-25 PROCEDURE — 94762 N-INVAS EAR/PLS OXIMTRY CONT: CPT

## 2017-05-25 PROCEDURE — 80048 BASIC METABOLIC PNL TOTAL CA: CPT | Performed by: UROLOGY

## 2017-05-25 PROCEDURE — 94760 N-INVAS EAR/PLS OXIMETRY 1: CPT

## 2017-05-25 RX ORDER — OXYBUTYNIN CHLORIDE 5 MG/1
10 TABLET, EXTENDED RELEASE ORAL DAILY
Status: DISCONTINUED | OUTPATIENT
Start: 2017-05-25 | End: 2017-05-26 | Stop reason: HOSPADM

## 2017-05-25 RX ADMIN — KETOROLAC TROMETHAMINE 15 MG: 30 INJECTION, SOLUTION INTRAMUSCULAR at 12:17

## 2017-05-25 RX ADMIN — KETOROLAC TROMETHAMINE 15 MG: 30 INJECTION, SOLUTION INTRAMUSCULAR at 23:17

## 2017-05-25 RX ADMIN — ENOXAPARIN SODIUM 40 MG: 40 INJECTION SUBCUTANEOUS at 20:07

## 2017-05-25 RX ADMIN — KETOROLAC TROMETHAMINE 15 MG: 30 INJECTION, SOLUTION INTRAMUSCULAR at 05:59

## 2017-05-25 RX ADMIN — KETOROLAC TROMETHAMINE 15 MG: 30 INJECTION, SOLUTION INTRAMUSCULAR at 00:26

## 2017-05-25 RX ADMIN — LEVOFLOXACIN 500 MG: 500 TABLET, FILM COATED ORAL at 08:17

## 2017-05-25 RX ADMIN — DOCUSATE SODIUM 100 MG: 100 CAPSULE, LIQUID FILLED ORAL at 17:37

## 2017-05-25 RX ADMIN — ONDANSETRON 4 MG: 2 INJECTION INTRAMUSCULAR; INTRAVENOUS at 14:16

## 2017-05-25 RX ADMIN — OXYBUTYNIN CHLORIDE 10 MG: 5 TABLET, EXTENDED RELEASE ORAL at 08:18

## 2017-05-25 RX ADMIN — DEXTROSE, SODIUM CHLORIDE, SODIUM LACTATE, POTASSIUM CHLORIDE, AND CALCIUM CHLORIDE 125 ML/HR: 5; .6; .31; .03; .02 INJECTION, SOLUTION INTRAVENOUS at 01:54

## 2017-05-25 RX ADMIN — KETOROLAC TROMETHAMINE 15 MG: 30 INJECTION, SOLUTION INTRAMUSCULAR at 17:37

## 2017-05-25 RX ADMIN — DOCUSATE SODIUM 100 MG: 100 CAPSULE, LIQUID FILLED ORAL at 08:16

## 2017-05-25 RX ADMIN — Medication: at 17:36

## 2017-05-25 RX ADMIN — DEXTROSE, SODIUM CHLORIDE, SODIUM LACTATE, POTASSIUM CHLORIDE, AND CALCIUM CHLORIDE 125 ML/HR: 5; .6; .31; .03; .02 INJECTION, SOLUTION INTRAVENOUS at 08:20

## 2017-05-26 VITALS
DIASTOLIC BLOOD PRESSURE: 51 MMHG | SYSTOLIC BLOOD PRESSURE: 102 MMHG | HEIGHT: 65 IN | OXYGEN SATURATION: 96 % | RESPIRATION RATE: 18 BRPM | HEART RATE: 64 BPM | BODY MASS INDEX: 24.32 KG/M2 | WEIGHT: 146 LBS | TEMPERATURE: 98 F

## 2017-05-26 DIAGNOSIS — Z98.890 OTHER SPECIFIED POSTPROCEDURAL STATES: ICD-10-CM

## 2017-05-26 DIAGNOSIS — N13.30 HYDRONEPHROSIS: ICD-10-CM

## 2017-05-26 PROBLEM — N13.5 UPJ OBSTRUCTION, ACQUIRED: Status: ACTIVE | Noted: 2017-05-26

## 2017-05-26 LAB
ANION GAP SERPL CALCULATED.3IONS-SCNC: 8 MMOL/L (ref 4–13)
BASOPHILS # BLD AUTO: 0.05 THOUSANDS/ΜL (ref 0–0.1)
BASOPHILS NFR BLD AUTO: 1 % (ref 0–1)
BUN SERPL-MCNC: 5 MG/DL (ref 5–25)
CALCIUM SERPL-MCNC: 7.9 MG/DL (ref 8.3–10.1)
CHLORIDE SERPL-SCNC: 107 MMOL/L (ref 100–108)
CO2 SERPL-SCNC: 26 MMOL/L (ref 21–32)
CREAT SERPL-MCNC: 0.84 MG/DL (ref 0.6–1.3)
EOSINOPHIL # BLD AUTO: 0.07 THOUSAND/ΜL (ref 0–0.61)
EOSINOPHIL NFR BLD AUTO: 1 % (ref 0–6)
ERYTHROCYTE [DISTWIDTH] IN BLOOD BY AUTOMATED COUNT: 12.2 % (ref 11.6–15.1)
GFR SERPL CREATININE-BSD FRML MDRD: >60 ML/MIN/1.73SQ M
GLUCOSE SERPL-MCNC: 105 MG/DL (ref 65–140)
HCT VFR BLD AUTO: 31.6 % (ref 34.8–46.1)
HGB BLD-MCNC: 10.1 G/DL (ref 11.5–15.4)
LYMPHOCYTES # BLD AUTO: 2.2 THOUSANDS/ΜL (ref 0.6–4.47)
LYMPHOCYTES NFR BLD AUTO: 35 % (ref 14–44)
MCH RBC QN AUTO: 30.4 PG (ref 26.8–34.3)
MCHC RBC AUTO-ENTMCNC: 32 G/DL (ref 31.4–37.4)
MCV RBC AUTO: 95 FL (ref 82–98)
MONOCYTES # BLD AUTO: 0.37 THOUSAND/ΜL (ref 0.17–1.22)
MONOCYTES NFR BLD AUTO: 6 % (ref 4–12)
NEUTROPHILS # BLD AUTO: 3.58 THOUSANDS/ΜL (ref 1.85–7.62)
NEUTS SEG NFR BLD AUTO: 57 % (ref 43–75)
PLATELET # BLD AUTO: 150 THOUSANDS/UL (ref 149–390)
PMV BLD AUTO: 10.6 FL (ref 8.9–12.7)
POTASSIUM SERPL-SCNC: 3.6 MMOL/L (ref 3.5–5.3)
RBC # BLD AUTO: 3.32 MILLION/UL (ref 3.81–5.12)
SODIUM SERPL-SCNC: 141 MMOL/L (ref 136–145)
WBC # BLD AUTO: 6.27 THOUSAND/UL (ref 4.31–10.16)

## 2017-05-26 PROCEDURE — 80048 BASIC METABOLIC PNL TOTAL CA: CPT | Performed by: UROLOGY

## 2017-05-26 PROCEDURE — 85025 COMPLETE CBC W/AUTO DIFF WBC: CPT | Performed by: UROLOGY

## 2017-05-26 RX ORDER — ACETAMINOPHEN 325 MG/1
650 TABLET ORAL EVERY 6 HOURS PRN
Status: DISCONTINUED | OUTPATIENT
Start: 2017-05-26 | End: 2017-05-26 | Stop reason: HOSPADM

## 2017-05-26 RX ORDER — HYDROCODONE BITARTRATE AND ACETAMINOPHEN 5; 325 MG/1; MG/1
1 TABLET ORAL EVERY 6 HOURS PRN
Qty: 30 TABLET | Refills: 0 | Status: SHIPPED | OUTPATIENT
Start: 2017-05-26 | End: 2017-06-05

## 2017-05-26 RX ORDER — PROCHLORPERAZINE MALEATE 5 MG/1
5 TABLET ORAL EVERY 6 HOURS PRN
Qty: 30 TABLET | Refills: 0 | Status: SHIPPED | OUTPATIENT
Start: 2017-05-26 | End: 2018-09-19 | Stop reason: ALTCHOICE

## 2017-05-26 RX ORDER — OXYBUTYNIN CHLORIDE 10 MG/1
10 TABLET, EXTENDED RELEASE ORAL DAILY
Qty: 30 TABLET | Refills: 0 | Status: SHIPPED | OUTPATIENT
Start: 2017-05-26 | End: 2018-09-19 | Stop reason: ALTCHOICE

## 2017-05-26 RX ORDER — ONDANSETRON 4 MG/1
4 TABLET, FILM COATED ORAL EVERY 8 HOURS PRN
Qty: 20 TABLET | Refills: 0 | Status: SHIPPED | OUTPATIENT
Start: 2017-05-26 | End: 2018-09-19 | Stop reason: ALTCHOICE

## 2017-05-26 RX ORDER — DOCUSATE SODIUM 100 MG/1
100 CAPSULE, LIQUID FILLED ORAL 2 TIMES DAILY
Qty: 10 CAPSULE | Refills: 0 | Status: SHIPPED | OUTPATIENT
Start: 2017-05-26 | End: 2018-09-19 | Stop reason: ALTCHOICE

## 2017-05-26 RX ORDER — ECHINACEA PURPUREA EXTRACT 125 MG
1 TABLET ORAL AS NEEDED
Status: DISCONTINUED | OUTPATIENT
Start: 2017-05-26 | End: 2017-05-26 | Stop reason: HOSPADM

## 2017-05-26 RX ADMIN — HYDROCODONE BITARTRATE AND ACETAMINOPHEN 1 TABLET: 5; 325 TABLET ORAL at 16:48

## 2017-05-26 RX ADMIN — HYDROMORPHONE HYDROCHLORIDE 0.5 MG: 1 INJECTION, SOLUTION INTRAMUSCULAR; INTRAVENOUS; SUBCUTANEOUS at 13:10

## 2017-05-26 RX ADMIN — DOCUSATE SODIUM 100 MG: 100 CAPSULE, LIQUID FILLED ORAL at 07:55

## 2017-05-26 RX ADMIN — PROCHLORPERAZINE MALEATE 5 MG: 10 TABLET, FILM COATED ORAL at 07:55

## 2017-05-26 RX ADMIN — KETOROLAC TROMETHAMINE 15 MG: 30 INJECTION, SOLUTION INTRAMUSCULAR at 05:06

## 2017-05-26 RX ADMIN — ACETAMINOPHEN 650 MG: 325 TABLET, FILM COATED ORAL at 07:40

## 2017-05-26 RX ADMIN — MORPHINE SULFATE 2 MG: 2 INJECTION, SOLUTION INTRAMUSCULAR; INTRAVENOUS at 10:55

## 2017-05-26 RX ADMIN — OXYBUTYNIN CHLORIDE 10 MG: 5 TABLET, EXTENDED RELEASE ORAL at 07:55

## 2017-05-26 RX ADMIN — ENOXAPARIN SODIUM 40 MG: 40 INJECTION SUBCUTANEOUS at 07:54

## 2017-05-27 LAB
ABO GROUP BLD BPU: NORMAL
ABO GROUP BLD BPU: NORMAL
BPU ID: NORMAL
BPU ID: NORMAL
UNIT DISPENSE STATUS: NORMAL
UNIT DISPENSE STATUS: NORMAL
UNIT PRODUCT CODE: NORMAL
UNIT PRODUCT CODE: NORMAL
UNIT RH: NORMAL
UNIT RH: NORMAL

## 2017-06-07 ENCOUNTER — HOSPITAL ENCOUNTER (OUTPATIENT)
Dept: RADIOLOGY | Facility: HOSPITAL | Age: 47
Discharge: HOME | End: 2017-06-07
Attending: UROLOGY
Payer: COMMERCIAL

## 2017-06-07 ENCOUNTER — TRANSCRIBE ORDERS (OUTPATIENT)
Dept: ADMINISTRATIVE | Facility: HOSPITAL | Age: 47
End: 2017-06-07

## 2017-06-07 ENCOUNTER — ALLSCRIPTS OFFICE VISIT (OUTPATIENT)
Dept: OTHER | Facility: OTHER | Age: 47
End: 2017-06-07

## 2017-06-07 DIAGNOSIS — Z98.890 OTHER SPECIFIED POSTPROCEDURAL STATES: ICD-10-CM

## 2017-06-07 DIAGNOSIS — N13.30 HYDRONEPHROSIS, UNSPECIFIED HYDRONEPHROSIS TYPE: Primary | ICD-10-CM

## 2017-06-07 PROCEDURE — 51600 INJECTION FOR BLADDER X-RAY: CPT

## 2017-06-07 PROCEDURE — 74430 CONTRAST X-RAY BLADDER: CPT

## 2017-06-07 RX ADMIN — IOTHALAMATE MEGLUMINE 250 ML: 172 INJECTION URETERAL at 09:00

## 2017-06-09 ENCOUNTER — ALLSCRIPTS OFFICE VISIT (OUTPATIENT)
Dept: OTHER | Facility: OTHER | Age: 47
End: 2017-06-09

## 2017-06-09 ENCOUNTER — LAB REQUISITION (OUTPATIENT)
Dept: LAB | Facility: HOSPITAL | Age: 47
End: 2017-06-09
Payer: COMMERCIAL

## 2017-06-09 DIAGNOSIS — R30.0 DYSURIA: ICD-10-CM

## 2017-06-09 LAB
BACTERIA UR QL AUTO: ABNORMAL /HPF
BILIRUB UR QL STRIP: NEGATIVE
CLARITY UR: ABNORMAL
CLARITY UR: NORMAL
COLOR UR: YELLOW
COLOR UR: YELLOW
GLUCOSE (HISTORICAL): NORMAL
GLUCOSE UR STRIP-MCNC: NEGATIVE MG/DL
HGB UR QL STRIP.AUTO: ABNORMAL
HGB UR QL STRIP.AUTO: NORMAL
HYALINE CASTS #/AREA URNS LPF: ABNORMAL /LPF
KETONES UR STRIP-MCNC: NEGATIVE MG/DL
KETONES UR STRIP-MCNC: NORMAL MG/DL
LEUKOCYTE ESTERASE UR QL STRIP: ABNORMAL
LEUKOCYTE ESTERASE UR QL STRIP: NORMAL
NITRITE UR QL STRIP: NORMAL
NITRITE UR QL STRIP: POSITIVE
NON-SQ EPI CELLS URNS QL MICRO: ABNORMAL /HPF
PH UR STRIP.AUTO: 6 [PH]
PH UR STRIP.AUTO: 6 [PH] (ref 4.5–8)
PROT UR STRIP-MCNC: ABNORMAL MG/DL
PROT UR STRIP-MCNC: NORMAL MG/DL
RBC #/AREA URNS AUTO: ABNORMAL /HPF
SP GR UR STRIP.AUTO: 1.02
SP GR UR STRIP.AUTO: 1.02 (ref 1–1.03)
UROBILINOGEN UR QL STRIP.AUTO: 0.2 E.U./DL
WBC #/AREA URNS AUTO: ABNORMAL /HPF

## 2017-06-09 PROCEDURE — 81001 URINALYSIS AUTO W/SCOPE: CPT | Performed by: UROLOGY

## 2017-06-09 PROCEDURE — 87186 SC STD MICRODIL/AGAR DIL: CPT | Performed by: UROLOGY

## 2017-06-09 PROCEDURE — 87147 CULTURE TYPE IMMUNOLOGIC: CPT | Performed by: UROLOGY

## 2017-06-09 PROCEDURE — 87086 URINE CULTURE/COLONY COUNT: CPT | Performed by: UROLOGY

## 2017-06-11 LAB — BACTERIA UR CULT: NORMAL

## 2017-06-19 ENCOUNTER — HOSPITAL ENCOUNTER (OUTPATIENT)
Dept: ULTRASOUND IMAGING | Facility: HOSPITAL | Age: 47
Discharge: HOME | End: 2017-06-19
Payer: COMMERCIAL

## 2017-06-19 DIAGNOSIS — N13.30 HYDRONEPHROSIS, UNSPECIFIED HYDRONEPHROSIS TYPE: ICD-10-CM

## 2017-06-19 PROCEDURE — 76770 US EXAM ABDO BACK WALL COMP: CPT

## 2017-07-07 ENCOUNTER — ALLSCRIPTS OFFICE VISIT (OUTPATIENT)
Dept: OTHER | Facility: OTHER | Age: 47
End: 2017-07-07

## 2017-07-07 DIAGNOSIS — N13.30 HYDRONEPHROSIS: ICD-10-CM

## 2017-07-17 ENCOUNTER — HOSPITAL ENCOUNTER (OUTPATIENT)
Dept: ULTRASOUND IMAGING | Facility: HOSPITAL | Age: 47
Discharge: HOME | End: 2017-07-17
Attending: UROLOGY
Payer: COMMERCIAL

## 2017-07-17 DIAGNOSIS — N13.30 HYDRONEPHROSIS: ICD-10-CM

## 2017-07-17 PROCEDURE — 76770 US EXAM ABDO BACK WALL COMP: CPT

## 2017-07-24 ENCOUNTER — TRANSCRIBE ORDERS (OUTPATIENT)
Dept: ADMINISTRATIVE | Facility: HOSPITAL | Age: 47
End: 2017-07-24

## 2017-07-24 ENCOUNTER — ALLSCRIPTS OFFICE VISIT (OUTPATIENT)
Dept: OTHER | Facility: OTHER | Age: 47
End: 2017-07-24

## 2017-07-24 DIAGNOSIS — Z98.890 PERSONAL HISTORY OF SURGERY TO HEART AND GREAT VESSELS, PRESENTING HAZARDS TO HEALTH: Primary | ICD-10-CM

## 2017-10-18 DIAGNOSIS — Z98.890 OTHER SPECIFIED POSTPROCEDURAL STATES: ICD-10-CM

## 2017-10-18 DIAGNOSIS — N13.30 HYDRONEPHROSIS: ICD-10-CM

## 2017-10-19 ENCOUNTER — HOSPITAL ENCOUNTER (OUTPATIENT)
Dept: ULTRASOUND IMAGING | Facility: HOSPITAL | Age: 47
Discharge: HOME | End: 2017-10-19
Attending: UROLOGY
Payer: COMMERCIAL

## 2017-10-19 DIAGNOSIS — N13.30 HYDRONEPHROSIS: ICD-10-CM

## 2017-10-19 PROCEDURE — 76770 US EXAM ABDO BACK WALL COMP: CPT

## 2017-10-23 ENCOUNTER — TRANSCRIBE ORDERS (OUTPATIENT)
Dept: ADMINISTRATIVE | Facility: HOSPITAL | Age: 47
End: 2017-10-23

## 2017-10-23 ENCOUNTER — ALLSCRIPTS OFFICE VISIT (OUTPATIENT)
Dept: OTHER | Facility: OTHER | Age: 47
End: 2017-10-23

## 2017-10-23 DIAGNOSIS — Z96.0 STATUS POST IMPLANTATION OF ARTIFICIAL URINARY SPHINCTER: Primary | ICD-10-CM

## 2017-10-24 NOTE — PROGRESS NOTES
Assessment  1  Hydroureteronephrosis (591) (N13 30)   2  Status post ureteral reimplantation (V45 89) (U49 181)    Plan  Status post ureteral reimplantation    · (1) BASIC METABOLIC PROFILE; Status:Active; Requested GYI:[id number];    Perform:[facility]; DTD:[id number]; Ordered; For:Status post ureteral reimplantation; Ordered By:Darryl Azevedo;   · * CT ABDOMEN PELVIS W CONTRAST; Status:Need Information - Financial Authorization; Requested HMV:[id number];    Perform:[facility]; WLR:[id number]; Last Updated By:Gracie Elizabeth; 10/23/2017 2:26:24 PM;Ordered; For:Status post ureteral reimplantation; Ordered By:Silver Azevedo; Discussion/Summary  Discussion Summary:   We discussed her symptoms and ultrasound findings  I believe that it is unlikely that her symptoms are related to her kidney  She however is convinced that this pain is similar to previous  We discussed that there may be urinary reflux he the left kidney causing the symptoms, although I think it is unlikely that this would be causing her pain  We discussed options such as nuclear medicine study or CT scan  She does have her ovaries despite hysterectomy, so CT scan may be reasonable to evaluate her left lower quadrant  She reports other generalized symptoms as well as skin symptoms, so if CT is negative, would refer back to PCP for possible evaluation for systemic syndrome or autoimmune  Patient understands and agrees  Medication SE Review and Pt Understands Tx: The treatment plan was reviewed with the patient/guardian  The patient/guardian understands and agrees with the treatment plan      Chief Complaint  Chief Complaint Free Text Note Form: Patient presents with Left sided kidney pain, S/P L open ureteral reimplantation (5/24/17)      History of Present Illness  HPI: The patient returns today complaining of left flank pain   She reports intermittent left flank pain with reasonable level of discomfort radiating around her flank, similar to her previous pain associated with hydronephrosis due to ureteral obstruction  He she is now 6 months status post open left ureteral reimplantation with excellent result  when she reports her symptoms, ultrasound was ordered which reveals normal kidneys with no evidence of hydronephrosis  Review of Systems  Complete-Female Urology:   Constitutional: No fever, no chills, feels well, no tiredness, no recent weight gain or weight loss  Respiratory: No complaints of shortness of breath, no wheezing, no cough, no SOB on exertion, no orthopnea, no PND  Cardiovascular: No complaints of slow heart rate, no fast heart rate, no chest pain, no palpitations, no leg claudication, no lower extremity edema  Gastrointestinal: No complaints of abdominal pain, no constipation, no nausea or vomiting, no diarrhea, no bloody stools  Genitourinary: Empty sensation-- and-- stream quality fair, but-- no dysuria,-- no urinary hesitancy,-- no feelings of urinary urgency,-- no incontinence,-- no hematuria-- and-- no nocturia  Musculoskeletal: No complaints of arthralgias, no myalgias, no joint swelling or stiffness, no limb pain or swelling  Integumentary: No complaints of skin rash or lesions, no itching, no skin wounds, no breast pain or lump  Hematologic/Lymphatic: No complaints of swollen glands, no swollen glands in the neck, does not bleed easily, does not bruise easily  Neurological: No complaints of headache, no confusion, no convulsions, no numbness, no dizziness or fainting, no tingling, no limb weakness, no difficulty walking  Active Problems  1  Dysuria (788 1) (R30 0)   2  Encounter for screening mammogram for breast cancer (V76 12) (Z12 31)   3  Fibrocystic breast disease, unspecified laterality (610 1) (N60 19)   4  Hydroureteronephrosis (591) (N13 30)   5  Lump of left breast (611 72) (N63)   6  Status post ureteral reimplantation (V45 89) (C28 036)    Past Medical History  1   History of Age At First Period 13 Years Old (Menarche)   2  History of Age At First Pregnancy 40 Years Old   3  History of Breast pain (611 71) (N64 4)   4  History of Fibrocystic breast disease, unspecified laterality (610 1) (N60 19)   5  History of Hearing Loss (389 9)   6  History of heartburn (V12 79) (Z87 898)   7  History of mastitis (V13 89) (Z87 898)   8  History of Previous Pregnancies Resulted In 1  Live Birth(S)   9  History of Previously Pregnant Including 3  Miscarriage(S)   10  History of Vision problem (V41 0) (H54 7)    Surgical History  1  History of Cystoscopy With Removal Of Object   2  History of Removal Of Lesion    Family History  Mother    1  Family history of Multiple Myeloma  Maternal Grandfather    2  Family history of Multiple Myeloma    Social History   · Alcohol Use (History)   · Caffeine Use   · Former smoker (A42 43) (D53 805)    Current Meds   1  No Reported Medications Recorded    Allergies  1  Penicillins    Vitals  Vital Signs    Recorded: 31YVT6095 01:41PM   Heart Rate 60   Systolic 416   Diastolic 56   Height 5 ft 5 in   Weight 150 lb    BMI Calculated 24 96   BSA Calculated 1 75     Physical Exam    Constitutional   General appearance: No acute distress, well appearing and well nourished  Pulmonary   Respiratory effort: No increased work of breathing or signs of respiratory distress  Cardiovascular   Examination of extremities for edema and/or varicosities: Normal     Abdomen   Abdomen: Non-tender, no masses  -- Incision well healed, no hernia  Musculoskeletal   Gait and station: Normal     Additional Exam:   no CVA tenderness  Results/Data  US KIDNEY AND BLADDER 19Oct2017 10:25AM Rita Borges Order Number: [id number]    - Patient Instructions: To schedule this appointment, please contact Central Scheduling at [phone]       Test Name Result Flag Reference   US KIDNEY AND BLADDER (Report)     RENAL ULTRASOUND     INDICATION: 59-year-old female with hydronephrosis and history of left ureteral implant  COMPARISON: 7/7/2017  TECHNIQUE:  Ultrasound of the retroperitoneum was performed with a curvilinear transducer utilizing volumetric sweeps and still imaging techniques  FINDINGS:     KIDNEYS:   Symmetric and normal size  Right kidney: 10 9 x 4 3 cm  Normal echogenicity and contour  No suspicious masses detected  No hydronephrosis  No shadowing calculi  No perinephric fluid collections  Left kidney: 10 7 x 4 5 cm  Normal echogenicity and contour  No suspicious masses detected  No hydronephrosis  No shadowing calculi  No perinephric fluid collections  URETERS:   Nonvisualized  BLADDER:    Normally distended  No focal thickening or mass lesions  Bilateral ureteral jets detected  IMPRESSION:     Normal ultrasound of the kidneys and bladder with interval resolution of mild left hydronephrosis          Workstation performed: [id number]     Signed by:   Aimee Diaz MD   10/20/17       Future Appointments    Date/Time Provider Specialty Site   01/15/2018 01:15 PM Seth Moscoso, 10 [facility] [address]     Signatures   Electronically signed by : LUIZA Napier ; Oct 23 2017  3:35PM EST                       (Author)

## 2017-10-27 ENCOUNTER — APPOINTMENT (OUTPATIENT)
Dept: LAB | Facility: CLINIC | Age: 47
End: 2017-10-27
Payer: COMMERCIAL

## 2017-10-27 DIAGNOSIS — Z98.890 OTHER SPECIFIED POSTPROCEDURAL STATES: ICD-10-CM

## 2017-10-27 LAB
ANION GAP SERPL CALCULATED.3IONS-SCNC: 9 MMOL/L (ref 4–13)
BUN SERPL-MCNC: 16 MG/DL (ref 5–25)
CALCIUM SERPL-MCNC: 8.9 MG/DL (ref 8.3–10.1)
CHLORIDE SERPL-SCNC: 102 MMOL/L (ref 100–108)
CO2 SERPL-SCNC: 25 MMOL/L (ref 21–32)
CREAT SERPL-MCNC: 0.85 MG/DL (ref 0.6–1.3)
GFR SERPL CREATININE-BSD FRML MDRD: 82 ML/MIN/1.73SQ M
GLUCOSE SERPL-MCNC: 94 MG/DL (ref 65–140)
POTASSIUM SERPL-SCNC: 3.8 MMOL/L (ref 3.5–5.3)
SODIUM SERPL-SCNC: 136 MMOL/L (ref 136–145)

## 2017-10-27 PROCEDURE — 80048 BASIC METABOLIC PNL TOTAL CA: CPT

## 2017-10-27 PROCEDURE — 36415 COLL VENOUS BLD VENIPUNCTURE: CPT

## 2017-11-01 ENCOUNTER — HOSPITAL ENCOUNTER (OUTPATIENT)
Dept: CT IMAGING | Facility: HOSPITAL | Age: 47
Discharge: HOME | End: 2017-11-01
Attending: UROLOGY
Payer: COMMERCIAL

## 2017-11-01 DIAGNOSIS — Z98.890 OTHER SPECIFIED POSTPROCEDURAL STATES: ICD-10-CM

## 2017-11-01 PROCEDURE — 74177 CT ABD & PELVIS W/CONTRAST: CPT

## 2017-11-01 RX ADMIN — IOHEXOL 100 ML: 350 INJECTION, SOLUTION INTRAVENOUS at 08:18

## 2018-01-08 ENCOUNTER — GENERIC CONVERSION - ENCOUNTER (OUTPATIENT)
Dept: OTHER | Facility: OTHER | Age: 48
End: 2018-01-08

## 2018-01-08 ENCOUNTER — HOSPITAL ENCOUNTER (OUTPATIENT)
Dept: ULTRASOUND IMAGING | Facility: HOSPITAL | Age: 48
Discharge: HOME | End: 2018-01-08
Payer: COMMERCIAL

## 2018-01-08 DIAGNOSIS — Z98.890 PERSONAL HISTORY OF SURGERY TO HEART AND GREAT VESSELS, PRESENTING HAZARDS TO HEALTH: ICD-10-CM

## 2018-01-08 DIAGNOSIS — Z98.890 OTHER SPECIFIED POSTPROCEDURAL STATES: ICD-10-CM

## 2018-01-08 PROCEDURE — 76770 US EXAM ABDO BACK WALL COMP: CPT

## 2018-01-09 NOTE — PROGRESS NOTES
Chief Complaint  pt presents for PVR assessment s/p left open ureteral 5/24/17 and luna removal Wed 6/7/17  Active Problems    1  Encounter for screening mammogram for breast cancer (V76 12) (Z12 31)   2  Fibrocystic breast disease, unspecified laterality (610 1) (N60 19)   3  Hydroureteronephrosis (591) (N13 30)   4  Lump of left breast (611 72) (N63)   5  Status post ureteral reimplantation (V45 89) (P12 620)    Allergies    1  Penicillins    Procedure    Procedure:   pt reports since luna removal increasing intensity of symptoms  constant strong urge to void with only voiding small amts very frequently, severe dysuria and bladder pain  "feels like i'm urinating razor blades " denies nausea and vomiting  low grade fever  last night with chills  chills today as well  took tylenol this am which did not help her symptoms  has stopped oxybutynin due to vision blurriness and is not taking any other anticholinergics or pain relief  still has left stent in place until July  Procedure: Bladder Ultrasound Post Void Residual    Equipment And Procedure: The patient voided 50 ml    U/S Findings: PVR:12 19 ml  Assessment    1  Dysuria (788 1) (R30 0)    Plan  Dysuria    · (1) URINALYSIS (will reflex a microscopy if leukocytes, occult blood, protein or nitrites  are not within normal limits); Status: In Progress - Specimen/Data  Collected,Retrospective By Protocol Authorization;   Done: 19HKR6141   Perform:[facility]; RWL:[id number]; Last Updated By:Portia Guido; 6/9/2017 11:29:51 AM;Ordered; Rochelle Michael; Ordered By:Darryl Azevedo;   · (1) URINE CULTURE; Source:Urine, Clean Catch; Status: In Progress - Specimen/Data  Collected,Retrospective By Protocol Authorization;   Done: 30PRJ1780   Perform:[facility]; NBD:[id number]; Last Updated By:Portia Guido; 6/9/2017 11:29:51 AM;Ordered; Rochelle Michael; Ordered By:Genesis Azevedo;   Hydroureteronephrosis, Status post ureteral reimplantation    · Ciprofloxacin HCl - 500 MG Oral Tablet; TAKE 1 TABLET TWICE DAILY   Rx By: Talya Peacock; Dispense: 3 Days ; #:6 Tablet; Refill: 0; For: Hydroureteronephrosis, Status post ureteral reimplantation; NIELS = N; Sent To: [facility] #[id number] Last Updated By: Edin Topete; 6/9/2017 11:29:51 AM   · Myrbetriq 25 MG Oral Tablet Extended Release 24 Hour; jose alejandro 1 tab daily   Rx By: Talya Peacock; Dispense: 14 Days ; #:14 Tablet Extended Release 24 Hour; Refill: 6; For: Hydroureteronephrosis, Status post ureteral reimplantation; NIELS = N; Dispense Sample; Last Updated By: Edin Tpoete; 6/9/2017 11:29:51 AM   · Urine Dip Non-Automated- POC; Status:Complete - Retrospective By Protocol  Authorization;   Done: 80QMZ1507 11:30AM   Performed: In Office; NNX:[id number]; Ordered;  For:Hydroureteronephrosis, Status post ureteral reimplantation; Ordered By:Sukhjinder Azevedo; Discussion/Summary    Per Dr Tiffany Kuo patient emptying bladder well  will check UA C&S, sent from office today to rule out UTI  Will empirically cover with cipro 500 mg BID for three days, will assess need for further antibx after culture and sensitivities return  Will dispense sample of myrbetriq 25 mg as alternative to oxybutynin for stent discomfort, pt to call if it works well and would like RX after samples finished  Additionally encouraged aggressive hydration and NSAIDS as well for the discomfort  reviewed patient could use up to 600 mg ibuprofen every 6 hours, reviewed this may increase bleeding risk and small amts urinary bleeding can be normal with ureteral stent in place  normal signs and symptoms of stents reviewed as well  Pt verbalized understanding of all directions and agrees to plan  Will FU as culture results become available  Future Appointments    Date/Time Provider Specialty Site   07/07/2017 08:30 AM LUIZA Harrington   Urology [facility] [address]     Signatures   Electronically signed by : Wero Solomon RN; Jun 9 2017 11:35AM EST                       (Author)    Electronically signed by : LUIZA Taylor ; Jun 9 2017 12:33PM EST

## 2018-01-12 VITALS
HEIGHT: 65 IN | WEIGHT: 145.38 LBS | BODY MASS INDEX: 24.22 KG/M2 | SYSTOLIC BLOOD PRESSURE: 110 MMHG | HEART RATE: 56 BPM | DIASTOLIC BLOOD PRESSURE: 76 MMHG

## 2018-01-12 VITALS
DIASTOLIC BLOOD PRESSURE: 86 MMHG | HEART RATE: 49 BPM | HEIGHT: 65 IN | SYSTOLIC BLOOD PRESSURE: 122 MMHG | WEIGHT: 147 LBS | BODY MASS INDEX: 24.49 KG/M2

## 2018-01-13 VITALS
BODY MASS INDEX: 24.24 KG/M2 | WEIGHT: 145.5 LBS | SYSTOLIC BLOOD PRESSURE: 124 MMHG | HEART RATE: 64 BPM | DIASTOLIC BLOOD PRESSURE: 76 MMHG | HEIGHT: 65 IN

## 2018-01-13 VITALS
HEIGHT: 65 IN | BODY MASS INDEX: 23.87 KG/M2 | HEART RATE: 60 BPM | WEIGHT: 143.25 LBS | DIASTOLIC BLOOD PRESSURE: 62 MMHG | SYSTOLIC BLOOD PRESSURE: 108 MMHG

## 2018-01-13 VITALS
HEART RATE: 60 BPM | HEIGHT: 65 IN | SYSTOLIC BLOOD PRESSURE: 108 MMHG | BODY MASS INDEX: 24.99 KG/M2 | WEIGHT: 150 LBS | DIASTOLIC BLOOD PRESSURE: 56 MMHG

## 2018-01-14 VITALS
DIASTOLIC BLOOD PRESSURE: 74 MMHG | WEIGHT: 146.13 LBS | BODY MASS INDEX: 24.35 KG/M2 | HEART RATE: 66 BPM | SYSTOLIC BLOOD PRESSURE: 120 MMHG | HEIGHT: 65 IN

## 2018-01-15 ENCOUNTER — ALLSCRIPTS OFFICE VISIT (OUTPATIENT)
Dept: OTHER | Facility: OTHER | Age: 48
End: 2018-01-15

## 2018-01-16 NOTE — PROGRESS NOTES
Assessment   1  Status post ureteral reimplantation (V45 89) (J17 928)    Plan   Status post ureteral reimplantation    · Follow-up visit in 1 year Evaluation and Treatment  Follow-up  Status: Hold For -    Scheduling  Requested for: 60GYJ2612   Ordered; For: Status post ureteral reimplantation; Ordered By: Haley Rincon Performed:  Due: 29CQU1728   · [address]; Status:Hold For - Scheduling; Requested EHX:[id number];    Perform:[facility]; WIW:[id number];MPUPGNQ; For:Status post ureteral reimplantation; Ordered By:Nimesh Miller; Discussion/Summary   Discussion Summary:    Left UPJ obstruction s/p left open ureteral re-implant (5/2017)   is a 51 y/o female being managed by Dr Griselda Hudson  She is doing well with no complaints at this time  Her renal ultrasound was reviewed and there is no evidence of hydronephrosis  She will return in 1 year for follow-up with renal ultrasound  All questions were answered  Chief Complaint   Chief Complaint Free Text Note Form: s/p ureteral reimplantation 5/24/17      History of Present Illness   HPI: 51 y/o female with left hydroureteronephrosis secondary to UPJ obstruction s/p left open ureteral re-implant (5/24/2017) presents today for follow up  She denies any pain or gross hematuria  She denies any lower urinary tract symptoms  She does note she was treated for an UTI by her PCP about 1 month ago  She had dysuria at this time  Symptoms resolved with antibiotics  She denies any changes in her overall health  Review of Systems   Complete-Female Urology:      Constitutional: No fever, no chills, feels well, no tiredness, no recent weight gain or weight loss  Respiratory: No complaints of shortness of breath, no wheezing, no cough, no SOB on exertion, no orthopnea, no PND  Cardiovascular: No complaints of slow heart rate, no fast heart rate, no chest pain, no palpitations, no leg claudication, no lower extremity edema        Gastrointestinal: No complaints of abdominal pain, no constipation, no nausea or vomiting, no diarrhea, no bloody stools  Genitourinary: Empty sensation-- and-- stream quality good, but-- no dysuria,-- no urinary hesitancy,-- no feelings of urinary urgency,-- no incontinence,-- no hematuria-- and-- no nocturia  Musculoskeletal: No complaints of arthralgias, no myalgias, no joint swelling or stiffness, no limb pain or swelling  Integumentary: No complaints of skin rash or lesions, no itching, no skin wounds, no breast pain or lump  Hematologic/Lymphatic: No complaints of swollen glands, no swollen glands in the neck, does not bleed easily, does not bruise easily  Neurological: No complaints of headache, no confusion, no convulsions, no numbness, no dizziness or fainting, no tingling, no limb weakness, no difficulty walking  ROS Reviewed:    ROS reviewed  Active Problems   1  Dysuria (788 1) (R30 0)   2  Encounter for screening mammogram for breast cancer (V76 12) (Z12 31)   3  Fibrocystic breast disease, unspecified laterality (610 1) (N60 19)   4  Hydroureteronephrosis (591) (N13 30)   5  Lump of left breast (611 72) (N63)   6  Status post ureteral reimplantation (V45 89) (U14 598)    Past Medical History   1  History of Age At First Period 13 Years Old (Menarche)   2  History of Age At First Pregnancy 40 Years Old   3  History of Breast pain (611 71) (N64 4)   4  History of Fibrocystic breast disease, unspecified laterality (610 1) (N60 19)   5  History of Hearing Loss (389 9)   6  History of heartburn (V12 79) (Z87 898)   7  History of mastitis (V13 89) (Z87 898)   8  History of Previous Pregnancies Resulted In 1  Live Birth(S)   9  History of Previously Pregnant Including 3  Miscarriage(S)   10  History of Vision problem (V41 0) (H54 7)  Active Problems And Past Medical History Reviewed: The active problems and past medical history were reviewed and updated today  Surgical History   1  History of Cystoscopy With Removal Of Object   2  History of Removal Of Lesion  Surgical History Reviewed: The surgical history was reviewed and updated today  Family History   Mother    1  Family history of Multiple Myeloma  Maternal Grandfather    2  Family history of Multiple Myeloma  Family History Reviewed: The family history was reviewed and updated today  Social History    · Alcohol Use (History)   · Caffeine Use   · Former smoker (C41 03) (H17 295)  Social History Reviewed: The social history was reviewed and updated today  The social history was reviewed and is unchanged  Current Meds    1  No Reported Medications  Requested for: 84IZB4096 Recorded  Medication List Reviewed: The medication list was reviewed and updated today  Allergies   1  Penicillins    Vitals   Vital Signs    Recorded: 22LDQ7775 22:01EE   Systolic 185   Diastolic 70   Height 5 ft 5 in   Weight 157 lb    BMI Calculated 26 13   BSA Calculated 1 78     Physical Exam        Constitutional      General appearance: No acute distress, well appearing and well nourished  Pulmonary      Respiratory effort: No increased work of breathing or signs of respiratory distress  Musculoskeletal      Gait and station: Normal        Skin      Skin and subcutaneous tissue: Normal without rashes or lesions  Results/Data   US KIDNEY AND BLADDER [id number] 12:55PM [place]      Test Name Result Flag Reference   US KIDNEY AND BLADDER (Report)     RENAL ULTRASOUND           INDICATION: Follow-up ureteral injury           COMPARISON: 10/19/2017           TECHNIQUE:  Ultrasound of the retroperitoneum was performed with a curvilinear transducer utilizing volumetric sweeps and still imaging techniques  FINDINGS:           KIDNEYS:      Symmetric and normal size  Right kidney: 10 4 x 5 3 cm  Normal echogenicity and contour  No suspicious masses detected  No hydronephrosis  No shadowing calculi  No perinephric fluid collections  Left kidney: 10 1 x 5 2 cm  Normal echogenicity and contour  No suspicious masses detected  No hydronephrosis  No shadowing calculi  No perinephric fluid collections  URETERS:      Nonvisualized  BLADDER:       Normally distended  No focal thickening or mass lesions  Bilateral ureteral jets detected                              IMPRESSION:           Normal                  Workstation performed: [id number]           Signed by:      Elder Morrissey MD      1/8/18      Signatures    Electronically signed by : Becky Pablo, [address] [place] [address]; Rickie 15 2018  1:43PM EST                       (Author)     Electronically signed by : LUIZA Pruitt ; Rickie 15 2018  2:43PM EST

## 2018-01-17 NOTE — PROGRESS NOTES
Preliminary Nursing Report                Patient Information    Initial Encounter Entry Date:   5/3/2017 3:24 PM EST (Automated Transmission Automated Transmission)       Last Modified:   {Kumar Shah}              Legal Name: Enriqueta Steele,[address] Number:        YOB: 1970        Age (years): 55        Gender: F        Body Mass Index (BMI): 24 kg/m2        Height: 65 in  Weight: 147 lbs (67 kgs)           Address:   [facility] [address]  [place] [id number] [place]              Phone: [phone]   (consent to leave messages)        Email:        Ethnicity: Decline to State        Amish:        Marital Status:        Preferred Language: English        Race: Other Race                    Patient Insurance Information        Primary Insurance Information Carrier Name: {Primary  CarrierName}           Carrier Address:   {Primary  CarrierAddress}              Carrier Phone: {Primary  CarrierPhone}          Group Number: {Primary  GroupNumber}          Policy Number: {Primary  PolicyNumber}          Insured Name: {Primary  InsuredName}          Insured : {Primary  InsuredDOB}          Relationship to Insured: {Primary  RelationshiptoInsured}           Secondary Insurance Information Carrier Name: {Secondary  CarrierName}           Carrier Address:   {Secondary  CarrierAddress}              Carrier Phone: {Secondary  CarrierPhone}          Group Number: {Secondary  GroupNumber}          Policy Number: {Secondary  PolicyNumber}          Insured Name: {Secondary  InsuredName}          Insured : {Secondary  InsuredDOB}          Relationship to Insured: {Secondary  RelationshiptoInsured}                       Health Profile   Booking #:   Bennie Fowler #: [id number]               DOS: 2017    Surgery : CYSTOURETHROSCOPY, WITH INSERTION OF INDWELLING URETERAL STENT (EG, HARDEN OR DOUBLE-J TYPE)    Add'l Procedures/Notes:     Surgery Risk: Minor          Precautions Hydroureteronephrosis                   Allergies    Penicillins             Medications    Hydrocodone-Acetaminophen 5-325 MG Oral Tablet       Naproxen 500 MG Oral Tablet               Conditions    Encounter for screening mammogram for breast cancer       Fibrocystic breast disease, unspecified laterality       Hydroureteronephrosis       Lump of left breast               Family History    None             Surgical History    None             Social History    Alcohol Use (History)       Caffeine Use       Former smoker                               Patient Instructions       Medical Procedure Risk  NPO Instructions   The day before surgery it is recommended to have a light dinner at your usual time and you are allowed a light snack early in the evening  Do not eat anything heavy or eat a big meal after 7pm  Do not eat or drink anything after midnight prior to your surgery  If you are supposed to take any of your medications, do so with a sip of water  Failure to follow these instructions can lead to an increased risk of lung complications and may result in a delay or cancellation of your procedure  If you have any questions, contact your institution for further instructions  No candy, no gum, no mints, no chewing tobacco          Naproxen 500 MG Oral Tablet  Medication Instruction (NSAID - Pain Medication) 61  Please stop ibuprofen, naproxen and other non-steroidal anti-inflammatory drugs (NSAIDS) for 24 hrs before surgery  Hydrocodone-Acetaminophen 5-325 MG Oral Tablet  Medication Instruction (Opioids - Pain Medication) 62  Please continue the following medications, if needed, up to and including the day of surgery (with a sip of water)  Testing Considerations       ? Pregnancy Test t  Consider a urine pregnancy test on the morning of surgery  Triggered by: Age or Facility Rec, Gender               Consultations       No recommendations for this classification               Miscellaneous Questions         Question: Are you able to walk up a flight of stairs, walk up a hill or do heavy housework WITHOUT having chest pain or shortness of breath? Answer: YES                   Allergies/Conditions/Medications Not Found        The following were not recognized by our system when generating the recommendations  Please consider if this would impact any preoperative protocols  ? Alcohol Use (History)       ? Caffeine Use       ? Encounter for screening mammogram for breast cancer                  Appointment Information         Date:    05/09/2017        Location[place]    [facility] [place]        Address:           Directions:                      Footnotes revision 14      ?? Denotes a free-text entry  Legal Disclaimer: Any and all recommendations and services provided herein are designed to assist in the preoperative care of the patient  Nothing contained herein is designed to replace, eliminate or alleviate the responsibility of the attending physician to supervise and determine the patient?s preoperative care and course of treatment  Failure to provide complete, accurate information may negatively impact the system?s ability to recommend the proper preoperative protocol  THE ATTENDING PHYSICIAN IS RESPONSIBLE TO REVIEW THE SUGGESTED PREOPERATIVE PROTOCOLS/COURSE OF TREATMENT AND PRESCRIBE THE FINAL COURSE OF PREOPERATIVE TREATMENT IN CONSULTATION WITH THE PATIENT  THE ePREOP SYSTEM AND ITS MATERIALS ARE PROVIDED ? AS IS? WITHOUT WARRANTY OF ANY KIND, EXPRESS OR IMPLIED, INCLUDING, BUT NOT LIMITED TO, WARRANTIES OF PERFORMANCE OR MERCHANTABILITY OR FITNESS FOR A PARTICULAR PURPOSE  PATIENT AND PHYSICIANS HEREBY AGREE THAT THEIR USE OF THE MATERIALS AND RESOURCES ACT AS A CONSENT TO RELEASE AND WAIVE ePREOP FROM ANY AND ALL CLAIMS OF WARRANTY, TORT OR CONTRACT LAW OF ANY KIND  Electronically signed Juliano ARSHAD    May  4 2017  9:41PM EST

## 2018-01-23 VITALS
BODY MASS INDEX: 26.16 KG/M2 | WEIGHT: 157 LBS | HEIGHT: 65 IN | DIASTOLIC BLOOD PRESSURE: 70 MMHG | SYSTOLIC BLOOD PRESSURE: 110 MMHG

## 2018-09-10 ENCOUNTER — TELEPHONE (OUTPATIENT)
Dept: OBGYN CLINIC | Facility: CLINIC | Age: 48
End: 2018-09-10

## 2018-09-10 DIAGNOSIS — R10.2 PELVIC PAIN IN FEMALE: Primary | ICD-10-CM

## 2018-09-10 DIAGNOSIS — N64.52 DISCHARGE FROM BOTH NIPPLES: ICD-10-CM

## 2018-09-14 DIAGNOSIS — N64.52 NIPPLE DISCHARGE: Primary | ICD-10-CM

## 2018-09-17 PROBLEM — N64.52 NIPPLE DISCHARGE: Status: ACTIVE | Noted: 2018-09-17

## 2018-09-19 ENCOUNTER — OFFICE VISIT (OUTPATIENT)
Dept: SURGICAL ONCOLOGY | Facility: CLINIC | Age: 48
End: 2018-09-19
Payer: COMMERCIAL

## 2018-09-19 ENCOUNTER — APPOINTMENT (OUTPATIENT)
Dept: LAB | Facility: CLINIC | Age: 48
End: 2018-09-19
Payer: COMMERCIAL

## 2018-09-19 VITALS
RESPIRATION RATE: 14 BRPM | WEIGHT: 151.5 LBS | BODY MASS INDEX: 25.24 KG/M2 | HEART RATE: 68 BPM | HEIGHT: 65 IN | TEMPERATURE: 97.8 F | SYSTOLIC BLOOD PRESSURE: 94 MMHG | DIASTOLIC BLOOD PRESSURE: 68 MMHG

## 2018-09-19 DIAGNOSIS — N64.52 NIPPLE DISCHARGE: Primary | ICD-10-CM

## 2018-09-19 DIAGNOSIS — N64.52 NIPPLE DISCHARGE: ICD-10-CM

## 2018-09-19 LAB — PROLACTIN SERPL-MCNC: 7.4 NG/ML

## 2018-09-19 PROCEDURE — 99243 OFF/OP CNSLTJ NEW/EST LOW 30: CPT | Performed by: SURGERY

## 2018-09-19 PROCEDURE — 36415 COLL VENOUS BLD VENIPUNCTURE: CPT

## 2018-09-19 PROCEDURE — 84146 ASSAY OF PROLACTIN: CPT

## 2018-09-19 NOTE — LETTER
September 19, 2018     KEEGAN Ding  [address] [facility] [address]    Patient: Rock Ansari   YOB: 1970   Date of Visit: 9/19/2018       Dear Dr Steffanie Babb: Thank you for referring Moose Satnoyo to me for evaluation  Below are my notes for this consultation  If you have questions, please do not hesitate to call me  I look forward to following your patient along with you  Sincerely,        Zabrina Okeefe MD        CC: MD Zabrina Kenyon MD  9/19/2018  9:04 AM  Sign at close encounter               Surgical Oncology Consult Note       [address],  [address]  1970  [id number]  [address] [facility],[address]  [facility] [address] [address]      Chief Complaint:   No chief complaint on file  Recent onset of bilateral nipple discharge    Assessment and Plan:   Assessment/Plan   Patient presents for bilateral nipple discharge  She has a normal clinical breast exam with no discharge today  Her mammogram from March showed no worrisome findings  We will have her films reviewed  I have recommended bilateral ultrasounds as well as prolactin level  I will see her back in 3 months  Oncology History:      No history exists  History of Present Illness: This is a 60-year-old woman who approximately 2 months ago noticed when she was doing her breast exam ends compressing her ducts and nipple there is a small amount of discharge slightly more prominent on the left than the right  It came from multiple ducts  Since that time she perform repeated breast exams  The fluid is sometimes green clear or yellow  It has not been bloody  It is not been spontaneous  Patient had a normal mammogram in March and brings her CD with her  Patient has no visual disturbances    She has no family history of breast cancer  Review of Systems:   Review of Systems   All other systems reviewed and are negative  Past Medical History:      Patient Active Problem List   Diagnosis    UPJ obstruction, acquired    Bilateral nipple discharge        Past Medical History:   Diagnosis Date    Hydroureteronephrosis         Past Surgical History:   Procedure Laterality Date    HYSTERECTOMY      TX CYSTO/URETERO/PYELOSCOPY, DX Left 5/9/2017    Procedure: URETEROSCOPY;  Surgeon: Mono Gan MD;  [facility];  Service: Urology    TX CYSTOURETHROSCOPY,URETER CATHETER Left 5/9/2017    Procedure: CYSTOSCOPY RETROGRADE PYELOGRAM WITH INSERTION STENT URETERAL;  Surgeon: Mono Gan MD;  [facility];  Service: Urology    [place] PART [address] [facility] [address] BLADDER,REIMPLNT URETER Left 5/24/2017    Procedure: OPEN URETERAL RE-IMPLANT ;  Surgeon: Mono Gan MD;  [facility];  Service: Urology      No family history on file  Social History     Social History    Marital status: /Civil Union     Spouse name: N/A    Number of children: N/A    Years of education: N/A     Occupational History    Not on file       Social History Main Topics    Smoking status: Former Smoker     Quit date: 2011    Smokeless tobacco: Never Used      Comment: stopped 5 years    Alcohol use 3 6 oz/week     6 Glasses of wine per week    Drug use: No    Sexual activity: Not on file     Other Topics Concern    Not on file     Social History Narrative    No narrative on file        Current Outpatient Prescriptions:     docusate sodium (COLACE) 100 mg capsule, Take 1 capsule by mouth 2 (two) times a day, Disp: 10 capsule, Rfl: 0    ondansetron (ZOFRAN) 4 mg tablet, Take 1 tablet by mouth every 8 (eight) hours as needed for nausea or vomiting, Disp: 20 tablet, Rfl: 0    oxybutynin (DITROPAN-XL) 10 MG 24 hr tablet, Take 1 tablet by mouth daily, Disp: 30 tablet, Rfl: 0    prochlorperazine (COMPAZINE) 5 mg tablet, Take 1 tablet by mouth every 6 (six) hours as needed for nausea or vomiting, Disp: 30 tablet, Rfl: 0     Allergies   Allergen Reactions    Penicillins Swelling     And Hives       Physical Exam:     Vitals:    09/19/18 0845   BP: 94/68   Pulse: 68   Resp: 14   Temp: 97 8 °F (36 6 °C)     Physical Exam   Pulmonary/Chest:     Both breasts were examined in the sitting and supine position  There are no worrisome skin lesions, no nipple retraction and no nipple discharge  There are no dominant masses, axillary adenopathy or supraclavicular adenopathy on either side  There was no nipple discharge on a standard exam   Mild to moderate compression in the periareolar region failed to reveal any discharge  Results:   Pending review  Discussion/Summary:   Given the normal exam and history of multiple duct bilaterally with minimal fluid I suspect this is within normal limits  I have recommended the patient not compress her nipple areolar region but be aware of any spontaneous nipple discharge  However since this seems to be somewhat new I have recommended we coordinate a prolactin level as well as have bilateral ultrasounds of the nipple areolar regions  Presuming that is normal we will see her back in 3 months for follow-up exam and further planning and recommendations  All questions were answered the patient's satisfaction  Advance Care Planning/Advance Directives:  I discussed the disease status, treatment plans and follow-up with the patient

## 2018-09-19 NOTE — PROGRESS NOTES
Surgical Oncology Consult Note       [address]  [facility] [address] [place] [address]    Jannet May  1970  [id number]  [address] [facility]  [facility] [address] [address]      Chief Complaint:   No chief complaint on file  Recent onset of bilateral nipple discharge    Assessment and Plan:   Assessment/Plan   Patient presents for bilateral nipple discharge  She has a normal clinical breast exam with no discharge today  Her mammogram from March showed no worrisome findings  We will have her films reviewed  I have recommended bilateral ultrasounds as well as prolactin level  I will see her back in 3 months  Oncology History:      No history exists  History of Present Illness: This is a 29-year-old woman who approximately 2 months ago noticed when she was doing her breast exam ends compressing her ducts and nipple there is a small amount of discharge slightly more prominent on the left than the right  It came from multiple ducts  Since that time she perform repeated breast exams  The fluid is sometimes green clear or yellow  It has not been bloody  It is not been spontaneous  Patient had a normal mammogram in March and brings her CD with her  Patient has no visual disturbances  She has no family history of breast cancer  Review of Systems:   Review of Systems   All other systems reviewed and are negative        Past Medical History:      Patient Active Problem List   Diagnosis    UPJ obstruction, acquired    Bilateral nipple discharge        Past Medical History:   Diagnosis Date    Hydroureteronephrosis         Past Surgical History:   Procedure Laterality Date    HYSTERECTOMY      OH CYSTO/URETERO/PYELOSCOPY, DX Left 5/9/2017    Procedure: URETEROSCOPY;  Surgeon: Clarence Bailey MD;  [facility];  Service: Urology    [place] CYSTOURETHROSCOPY,URETER CATHETER Left 5/9/2017    Procedure: CYSTOSCOPY RETROGRADE PYELOGRAM WITH INSERTION STENT URETERAL;  Surgeon: Edward Garcia MD;  [facility];  Service: Urology    [place] PART [address] [facility] [address] BLADDER,REIMPLNT URETER Left 5/24/2017    Procedure: OPEN URETERAL RE-IMPLANT ;  Surgeon: Edward Garcia MD;  [facility];  Service: Urology      No family history on file  Social History     Social History    Marital status: /Civil Union     Spouse name: N/A    Number of children: N/A    Years of education: N/A     Occupational History    Not on file  Social History Main Topics    Smoking status: Former Smoker     Quit date: 2011    Smokeless tobacco: Never Used      Comment: stopped 5 years    Alcohol use 3 6 oz/week     6 Glasses of wine per week    Drug use: No    Sexual activity: Not on file     Other Topics Concern    Not on file     Social History Narrative    No narrative on file        Current Outpatient Prescriptions:     docusate sodium (COLACE) 100 mg capsule, Take 1 capsule by mouth 2 (two) times a day, Disp: 10 capsule, Rfl: 0    ondansetron (ZOFRAN) 4 mg tablet, Take 1 tablet by mouth every 8 (eight) hours as needed for nausea or vomiting, Disp: 20 tablet, Rfl: 0    oxybutynin (DITROPAN-XL) 10 MG 24 hr tablet, Take 1 tablet by mouth daily, Disp: 30 tablet, Rfl: 0    prochlorperazine (COMPAZINE) 5 mg tablet, Take 1 tablet by mouth every 6 (six) hours as needed for nausea or vomiting, Disp: 30 tablet, Rfl: 0     Allergies   Allergen Reactions    Penicillins Swelling     And Hives       Physical Exam:     Vitals:    09/19/18 0845   BP: 94/68   Pulse: 68   Resp: 14   Temp: 97 8 °F (36 6 °C)     Physical Exam   Pulmonary/Chest:     Both breasts were examined in the sitting and supine position  There are no worrisome skin lesions, no nipple retraction and no nipple discharge   There are no dominant masses, axillary adenopathy or supraclavicular adenopathy on either side  There was no nipple discharge on a standard exam   Mild to moderate compression in the periareolar region failed to reveal any discharge  Results:   Pending review  Discussion/Summary:   Given the normal exam and history of multiple duct bilaterally with minimal fluid I suspect this is within normal limits  I have recommended the patient not compress her nipple areolar region but be aware of any spontaneous nipple discharge  However since this seems to be somewhat new I have recommended we coordinate a prolactin level as well as have bilateral ultrasounds of the nipple areolar regions  Presuming that is normal we will see her back in 3 months for follow-up exam and further planning and recommendations  All questions were answered the patient's satisfaction  Advance Care Planning/Advance Directives:  I discussed the disease status, treatment plans and follow-up with the patient

## 2018-09-20 ENCOUNTER — HOSPITAL ENCOUNTER (OUTPATIENT)
Dept: MAMMOGRAPHY | Facility: CLINIC | Age: 48
Discharge: HOME | End: 2018-09-20

## 2018-09-20 DIAGNOSIS — Z76.89 REFERRAL OF PATIENT WITHOUT EXAMINATION OR TREATMENT: ICD-10-CM

## 2018-09-24 ENCOUNTER — APPOINTMENT (OUTPATIENT)
Dept: ULTRASOUND IMAGING | Facility: CLINIC | Age: 48
End: 2018-09-24
Payer: COMMERCIAL

## 2018-09-24 ENCOUNTER — HOSPITAL ENCOUNTER (OUTPATIENT)
Dept: ULTRASOUND IMAGING | Facility: CLINIC | Age: 48
Discharge: HOME | End: 2018-09-24
Payer: COMMERCIAL

## 2018-09-24 DIAGNOSIS — N64.52 NIPPLE DISCHARGE: ICD-10-CM

## 2018-09-24 PROCEDURE — 76642 ULTRASOUND BREAST LIMITED: CPT

## 2018-09-26 ENCOUNTER — OFFICE VISIT (OUTPATIENT)
Dept: OBGYN CLINIC | Facility: CLINIC | Age: 48
End: 2018-09-26
Payer: COMMERCIAL

## 2018-09-26 VITALS
HEIGHT: 65 IN | SYSTOLIC BLOOD PRESSURE: 110 MMHG | WEIGHT: 152 LBS | DIASTOLIC BLOOD PRESSURE: 70 MMHG | BODY MASS INDEX: 25.33 KG/M2

## 2018-09-26 DIAGNOSIS — Z01.419 ENCNTR FOR GYN EXAM (GENERAL) (ROUTINE) W/O ABN FINDINGS: Primary | ICD-10-CM

## 2018-09-26 PROCEDURE — 99396 PREV VISIT EST AGE 40-64: CPT | Performed by: NURSE PRACTITIONER

## 2018-09-26 NOTE — PROGRESS NOTES
Assessment/Plan   Diagnoses and all orders for this visit:    Encntr for gyn exam (general) (routine) w/o abn findings      Discussion    Reviewed normal exam today  Pap deferred today  Normal breast exam today  Monthly SBEs advised  Continue to follow with Dr Salvador Fang  Reviewed menopause in detail with patient  Reviewed treatment options briefly such as OTC herbal remedies vs prescription HRT  Pt declines needing anything at this time  Rx for pelvic ultrasound given to assess for left sided pain  If all normal will refer back to urology  Encourage at least 1200 mg calcium citrate + 2000 IUs vitamin D3 divided through diet and supplement throughout the day  Encourage 30-40 min weight bearing exercise most days of week  Colon cancer screening with a colonoscopy is recommended starting at age 39 and reviewed benefits  All questions have been answered to her satisfaction  RTO for annual or sooner if needed    Liban Lee is a 52 y o  female who presents for annual well woman exam    Last exam 4 years ago Pap Normal per patient   Pap guidelines reviewed with patient  Pap deferred today  Reviewed importance of annual breast and pelvic exam   Pt denies any abnormal vaginal discharge, itching, or odor  Pt in a mutually exclusive relationship () with a male partner and denies the need for STD testing today  Menstrual Cycle:  LMP: 2014   Hysterectomy 2014  Had hysterectomy in 2014 for menorrhagia and dysmenorrhea  Was great post-hysterectomy and began to have issues with back pain and fatigue  Presented to PCP who sent to ER for evaluation  Er visit found enlarged kidney  After several rounds of testing conclusion came that scar tissue likely from hysterectomy had wrapped around left ureter cutting off flow which in turn led to enlarged kidney  She underwent several surgeries to repair ureter and will follow thru with urology yearly   Last year had a scan by urologist that showed everything normal in left pelvic region but did note a left ovarian cyst  Pt was complaining of pain at that time  Pt states pain has continued since last fall  Describes pain as a constant dull ache in lower left pelvic region  Will also notice discomfort with intercourse  Denies anything that makes pain worse or better  Pt does have some menopausal symptoms  + night sweats, + hot flashes, + mood swings, mild vaginal dryness and decrease in libido  Manageable at this time  OB History     Obstetric Comments    Age at menarche 13  Age first live birth 40    Contraception:   Practices monthly SBEs, no breast complaints today  Last Mammogram: bilateral breast ultrasounds: [facility] II  Currently following with Dr Funmilayo Hale d/t abnormal nipple discharge  Prolactin Normal, clinical exam and ultrasounds WNL  Plan to follow with Dr Funmilayo Hale every 3 months for now  Colonoscopy 5 years ago, normal  Denies any bowel or bladder issues  Pt follows with PCP for regular check-ups and blood work  Review of Systems   Genitourinary: Positive for pelvic pain  All other systems reviewed and are negative        The following portions of the patient's history were reviewed and updated as appropriate: allergies, current medications, past family history, past medical history, past social history, past surgical history and problem list     Past Medical History:   Diagnosis Date    Hydroureteronephrosis        Past Surgical History:   Procedure Laterality Date    HYSTERECTOMY      [place] CYSTO/URETERO/PYELOSCOPY, DX Left 5/9/2017    Procedure: URETEROSCOPY;  Surgeon: Mono Gan MD;  [facility];  Service: Urology    WI CYSTOURETHROSCOPY,URETER CATHETER Left 5/9/2017    Procedure: CYSTOSCOPY RETROGRADE PYELOGRAM WITH INSERTION STENT URETERAL;  Surgeon: Mono Gan MD;  [facility];  Service: Urology    [place] PART [address] [facility] BLADDER,REIMPLNT URETER Left 5/24/2017    Procedure: OPEN URETERAL RE-IMPLANT ; Surgeon: Olga Cedeno MD;  [facility];  Service: Urology       Family History   Problem Relation Age of Onset    Multiple myeloma Mother     Pancreatic cancer Maternal Uncle     Multiple myeloma Maternal Grandfather        Social History     Social History    Marital status: /Civil Union     Spouse name: N/A    Number of children: N/A    Years of education: N/A     Occupational History    Not on file  Social History Main Topics    Smoking status: Former Smoker     Packs/day: 0 50     Quit date: 2012    Smokeless tobacco: Never Used      Comment: stopped 5 years    Alcohol use 3 6 oz/week     6 Glasses of wine per week    Drug use: No    Sexual activity: Not on file     Other Topics Concern    Not on file     Social History Narrative    No narrative on file       No current outpatient prescriptions on file  Allergies   Allergen Reactions    Penicillins Swelling and Hives     And Hives       Objective   Vitals:    09/26/18 0958   BP: 110/70   BP Location: Left arm   Patient Position: Sitting   Cuff Size: Standard   Weight: 68 9 kg (152 lb)   Height: 5' 5" (1 651 m)     Physical Exam   Constitutional: She is oriented to person, place, and time  She appears well-developed and well-nourished  HENT:   Head: Normocephalic  Neck: Normal range of motion  Neck supple  No tracheal deviation present  No thyromegaly present  Cardiovascular: Normal rate, regular rhythm and normal heart sounds  Pulmonary/Chest: Effort normal and breath sounds normal  Right breast exhibits no inverted nipple, no mass, no nipple discharge, no skin change and no tenderness  Left breast exhibits no inverted nipple, no mass, no nipple discharge, no skin change and no tenderness  Breasts are symmetrical    Abdominal: Soft  Bowel sounds are normal  She exhibits no distension and no mass  There is no tenderness  There is no rebound and no guarding     Genitourinary: Vagina normal  Rectal exam shows guaiac negative stool  No breast swelling, tenderness, discharge or bleeding  No labial fusion  There is no rash, tenderness, lesion or injury on the right labia  There is no rash, tenderness, lesion or injury on the left labia  Right adnexum displays no mass, no tenderness and no fullness  Left adnexum displays no mass, no tenderness and no fullness  Genitourinary Comments: Uterus and cervix surgically absent   Musculoskeletal: Normal range of motion  Neurological: She is alert and oriented to person, place, and time  Skin: Skin is warm and dry  Psychiatric: She has a normal mood and affect   Her behavior is normal  Judgment and thought content normal

## 2018-10-02 ENCOUNTER — HOSPITAL ENCOUNTER (OUTPATIENT)
Dept: ULTRASOUND IMAGING | Facility: HOSPITAL | Age: 48
Discharge: HOME | End: 2018-10-02
Payer: COMMERCIAL

## 2018-10-02 DIAGNOSIS — R10.2 PELVIC PAIN IN FEMALE: ICD-10-CM

## 2018-10-02 PROCEDURE — 76830 TRANSVAGINAL US NON-OB: CPT

## 2018-10-02 PROCEDURE — 76856 US EXAM PELVIC COMPLETE: CPT

## 2019-01-08 DIAGNOSIS — Z98.890 S/P URETERAL REIMPLANTATION: Primary | ICD-10-CM

## 2019-01-14 ENCOUNTER — TELEPHONE (OUTPATIENT)
Dept: SURGICAL ONCOLOGY | Facility: CLINIC | Age: 49
End: 2019-01-14

## 2019-01-15 DIAGNOSIS — Z98.890 OTHER SPECIFIED POSTPROCEDURAL STATES: ICD-10-CM

## 2019-02-22 ENCOUNTER — HOSPITAL ENCOUNTER (OUTPATIENT)
Dept: ULTRASOUND IMAGING | Facility: HOSPITAL | Age: 49
Discharge: HOME | End: 2019-02-22
Payer: COMMERCIAL

## 2019-02-22 DIAGNOSIS — Z98.890 S/P URETERAL REIMPLANTATION: ICD-10-CM

## 2019-02-22 PROCEDURE — 51798 US URINE CAPACITY MEASURE: CPT

## 2019-03-25 NOTE — PROGRESS NOTES
3/26/2019    Tong Huffman  1970  [id number]        Assessment  -Left UPJ obstruction s/p left open ureteral re-implant (5/2017)    Discussion/Plan  Kailyn Chandler is a 50 y o  female being managed by Dr Sean Astudillo       -PVR is 42cc  -We reviewed results of recent renal US which shows no evidence of hydronephrosis and nominal post void residual volume  No evidence of renal calculi noted  She continues to do well without any urinary complaints  We discussed follow up on as-needed basis  Patient is amenable with this plan  She was instructed to call with any issues  -All questions answered, patients agree with plan     History of Present Illness  50 y o  female with a history of left UPJ obstruction s/p left open ureteral re-implant (5/2017) presents today for follow up  Patient states she has been doing well and denies any lower urinary tract symptoms, gross hematuria, or dysuria  She does report occasional episodes of left-sided lower back discomfort, but believes this is musculoskeletal in etiology  She denies any episodes of urinary tract infection in the last year  No overall changes to health since last office visit  Review of Systems  Review of Systems   Constitutional: Negative  HENT: Negative  Respiratory: Negative  Cardiovascular: Negative  Gastrointestinal: Negative  Genitourinary: Negative for decreased urine volume, difficulty urinating, dysuria, flank pain, frequency, hematuria and urgency  Musculoskeletal: Negative  Skin: Negative  Neurological: Negative  Psychiatric/Behavioral: Negative          Past Medical History  Past Medical History:   Diagnosis Date    Hydroureteronephrosis        Past Social History  Past Surgical History:   Procedure Laterality Date    HYSTERECTOMY      [place] CYSTO/URETERO/PYELOSCOPY, DX Left 5/9/2017    Procedure: URETEROSCOPY;  Surgeon: Taylor Ellis MD;  [facility];  Service: Urology    TAMMY Gentile CATHETER Left 2017    Procedure: CYSTOSCOPY RETROGRADE PYELOGRAM WITH INSERTION STENT URETERAL;  Surgeon: Candace Camilo MD;  [facility];  Service: Urology    [place] PART [address] [facility] [address] BLADDER,REIMPLNT URETER Left 2017    Procedure: OPEN URETERAL RE-IMPLANT ;  Surgeon: Candace Camilo MD;  [facility];  Service: Urology       Past Family History  Family History   Problem Relation Age of Onset    Multiple myeloma Mother     Pancreatic cancer Maternal Uncle     Multiple myeloma Maternal Grandfather        Past Social history  Social History     Socioeconomic History    Marital status: /Civil Union     Spouse name: Not on file    Number of children: Not on file    Years of education: Not on file    Highest education level: Not on file   Occupational History    Not on file   Social Needs    Financial resource strain: Not on file    Food insecurity:     Worry: Not on file     Inability: Not on file    Transportation needs:     Medical: Not on file     Non-medical: Not on file   Tobacco Use    Smoking status: Former Smoker     Packs/day: 0 50     Last attempt to quit: 2012     Years since quittin 2    Smokeless tobacco: Never Used    Tobacco comment: stopped 5 years   Substance and Sexual Activity    Alcohol use:  Yes     Alcohol/week: 3 6 oz     Types: 6 Glasses of wine per week    Drug use: No    Sexual activity: Not on file   Lifestyle    Physical activity:     Days per week: Not on file     Minutes per session: Not on file    Stress: Not on file   Relationships    Social connections:     Talks on phone: Not on file     Gets together: Not on file     Attends Latter day service: Not on file     Active member of club or organization: Not on file     Attends meetings of clubs or organizations: Not on file     Relationship status: Not on file    Intimate partner violence:     Fear of current or ex partner: Not on file     Emotionally abused: Not on file     Physically abused: Not on file     Forced sexual activity: Not on file   Other Topics Concern    Not on file   Social History Narrative    Not on file       Current Medications  No current outpatient medications on file  No current facility-administered medications for this visit  Allergies  Allergies   Allergen Reactions    Penicillins Swelling and Hives     And Hives       Past medical history, social history, family history, medications and allergies were reviewed  Vitals  There were no vitals filed for this visit  Physical Exam  Physical Exam   Constitutional: She is oriented to person, place, and time  She appears well-developed and well-nourished  HENT:   Head: Normocephalic  Eyes: Pupils are equal, round, and reactive to light  Neck: Normal range of motion  Cardiovascular: Normal rate and regular rhythm  Pulmonary/Chest: Effort normal    Abdominal: Soft  Normal appearance  She exhibits no distension  There is no tenderness  There is no CVA tenderness  Genitourinary:   Genitourinary Comments: Negative CVA tenderness   Musculoskeletal: Normal range of motion  Neurological: She is alert and oriented to person, place, and time  Skin: Skin is warm and dry  Psychiatric: She has a normal mood and affect  Her behavior is normal  Judgment and thought content normal        Results    I have personally reviewed all pertinent lab results and reviewed with patient  Lab Results   Component Value Date    GLUCOSE 104 04/06/2015    CALCIUM 8 9 10/27/2017     04/06/2015    K 3 8 10/27/2017    CO2 25 10/27/2017     10/27/2017    BUN 16 10/27/2017    CREATININE 0 85 10/27/2017     Lab Results   Component Value Date    WBC 6 27 05/26/2017    HGB 10 1 (L) 05/26/2017    HCT 31 6 (L) 05/26/2017    MCV 95 05/26/2017     05/26/2017     No results found for this or any previous visit (from the past 1 hour(s))

## 2019-03-26 ENCOUNTER — OFFICE VISIT (OUTPATIENT)
Dept: UROLOGY | Facility: AMBULATORY SURGERY CENTER | Age: 49
End: 2019-03-26
Payer: COMMERCIAL

## 2019-03-26 VITALS
BODY MASS INDEX: 25.86 KG/M2 | SYSTOLIC BLOOD PRESSURE: 118 MMHG | HEIGHT: 65 IN | DIASTOLIC BLOOD PRESSURE: 72 MMHG | HEART RATE: 64 BPM | WEIGHT: 155.2 LBS

## 2019-03-26 DIAGNOSIS — N13.5 UPJ OBSTRUCTION, ACQUIRED: Primary | ICD-10-CM

## 2019-03-26 LAB — POST-VOID RESIDUAL VOLUME, ML POC: 42 ML

## 2019-03-26 PROCEDURE — 99213 OFFICE O/P EST LOW 20 MIN: CPT | Performed by: NURSE PRACTITIONER

## 2019-03-26 PROCEDURE — 51798 US URINE CAPACITY MEASURE: CPT | Performed by: NURSE PRACTITIONER

## 2019-03-26 RX ORDER — DOXYCYCLINE 50 MG/1
CAPSULE ORAL
Refills: 2 | COMMUNITY
Start: 2019-03-05 | End: 2022-03-08 | Stop reason: ALTCHOICE

## 2019-07-29 ENCOUNTER — TELEPHONE (OUTPATIENT)
Dept: DERMATOLOGY | Facility: CLINIC | Age: 49
End: 2019-07-29

## 2019-07-29 NOTE — TELEPHONE ENCOUNTER
Called pt to schedule an appt for Wed 7/31 with Dr Alexander Peacock due to cancellation  She will call back shortly/ She is on a conference call

## 2019-07-30 ENCOUNTER — TELEPHONE (OUTPATIENT)
Dept: DERMATOLOGY | Facility: CLINIC | Age: 49
End: 2019-07-30

## 2019-07-30 NOTE — TELEPHONE ENCOUNTER
Telephone Call to pt   BRISEIDA to please return my call as earliest as her convenience to offer a appointment with Dr Marco Antonio Solomon on 8/28/2019 - 8/29/2019

## 2019-08-05 ENCOUNTER — TELEPHONE (OUTPATIENT)
Dept: DERMATOLOGY | Facility: CLINIC | Age: 49
End: 2019-08-05

## 2019-08-05 NOTE — TELEPHONE ENCOUNTER
Telephone Call to patient  Spoke to patient to scheduled appointment with Dr Frannie Martinez   Patient stated will call back to scheduled a appointment

## 2019-08-12 ENCOUNTER — TELEPHONE (OUTPATIENT)
Dept: DERMATOLOGY | Facility: CLINIC | Age: 49
End: 2019-08-12

## 2021-03-31 DIAGNOSIS — Z23 ENCOUNTER FOR IMMUNIZATION: ICD-10-CM

## 2021-04-06 ENCOUNTER — IMMUNIZATIONS (OUTPATIENT)
Dept: FAMILY MEDICINE CLINIC | Facility: HOSPITAL | Age: 51
End: 2021-04-06

## 2021-04-06 DIAGNOSIS — Z23 ENCOUNTER FOR IMMUNIZATION: Primary | ICD-10-CM

## 2021-04-06 PROCEDURE — 0001A SARS-COV-2 / COVID-19 MRNA VACCINE (PFIZER-BIONTECH) 30 MCG: CPT

## 2021-04-06 PROCEDURE — 91300 SARS-COV-2 / COVID-19 MRNA VACCINE (PFIZER-BIONTECH) 30 MCG: CPT

## 2021-04-27 ENCOUNTER — IMMUNIZATIONS (OUTPATIENT)
Dept: FAMILY MEDICINE CLINIC | Facility: HOSPITAL | Age: 51
End: 2021-04-27

## 2021-04-27 DIAGNOSIS — Z23 ENCOUNTER FOR IMMUNIZATION: Primary | ICD-10-CM

## 2021-04-27 PROCEDURE — 91300 SARS-COV-2 / COVID-19 MRNA VACCINE (PFIZER-BIONTECH) 30 MCG: CPT

## 2021-04-27 PROCEDURE — 0002A SARS-COV-2 / COVID-19 MRNA VACCINE (PFIZER-BIONTECH) 30 MCG: CPT

## 2022-03-08 ENCOUNTER — TELEPHONE (OUTPATIENT)
Dept: ADMINISTRATIVE | Facility: OTHER | Age: 52
End: 2022-03-08

## 2022-03-08 ENCOUNTER — OFFICE VISIT (OUTPATIENT)
Dept: OBGYN CLINIC | Facility: CLINIC | Age: 52
End: 2022-03-08
Payer: COMMERCIAL

## 2022-03-08 VITALS
WEIGHT: 163.8 LBS | SYSTOLIC BLOOD PRESSURE: 102 MMHG | BODY MASS INDEX: 27.29 KG/M2 | HEIGHT: 65 IN | DIASTOLIC BLOOD PRESSURE: 68 MMHG

## 2022-03-08 DIAGNOSIS — Z00.00 WELL ADULT EXAM: ICD-10-CM

## 2022-03-08 DIAGNOSIS — Z01.419 ENCOUNTER FOR GYNECOLOGICAL EXAMINATION (GENERAL) (ROUTINE) WITHOUT ABNORMAL FINDINGS: Primary | ICD-10-CM

## 2022-03-08 DIAGNOSIS — Z12.31 ENCOUNTER FOR SCREENING MAMMOGRAM FOR MALIGNANT NEOPLASM OF BREAST: ICD-10-CM

## 2022-03-08 DIAGNOSIS — N95.1 VASOMOTOR SYMPTOMS DUE TO MENOPAUSE: ICD-10-CM

## 2022-03-08 PROBLEM — N64.52 NIPPLE DISCHARGE: Status: RESOLVED | Noted: 2018-09-17 | Resolved: 2022-03-08

## 2022-03-08 PROCEDURE — 99386 PREV VISIT NEW AGE 40-64: CPT | Performed by: PHYSICIAN ASSISTANT

## 2022-03-08 RX ORDER — ELECTROLYTES/DEXTROSE
1 SOLUTION, ORAL ORAL DAILY
COMMUNITY

## 2022-03-08 NOTE — ASSESSMENT & PLAN NOTE
The current ASCCP guidelines were reviewed  Patient's last pap was around 2014 and we have no documented pap in Epic, therefore, I reviewed with patient a pap with HPV cotesting is indicated at this time  I emphasized the importance of an annual pelvic and breast exam  Patient ok to have a pap done today

## 2022-03-08 NOTE — ASSESSMENT & PLAN NOTE
Discussed the natural course of menopause & associated symptoms  Reviewed treatment options: allow natural course without treatment and implement lifestyle management strategies; hormone therapy, low dose serotonin reuptake inhibitors (like Brisdelle)  Reviewed meds like SSRI may help to stabilize moods along with lesson her hot flashes/nights sweats  Encourage trial of supplements with black cohosh like Remifemin or Estroven; Wear layers of clothing  Drink plenty of water   Avoid triggers:  caffeine, alcohol, spicy foods, stress

## 2022-03-08 NOTE — TELEPHONE ENCOUNTER
----- Message from Aaliyah Charles PA-C sent at 3/8/2022  9:20 AM EST -----  03/08/22 9:20 AM     Hello, our patient Lino Pickett had a Colonoscopy  completed/performed  Please assist in updating the patient chart by making an External outreach to [facility] - done around 2017        Thank you,   Aaliyah Charles PA-C   [address] [place] [address]

## 2022-03-08 NOTE — PROGRESS NOTES
Assessment/Plan   Diagnoses and all orders for this visit:    Encounter for gynecological examination (general) (routine) without abnormal findings  The current ASCCP guidelines were reviewed  Patient's last pap was around 2014 and we have no documented pap in Epic, therefore, I reviewed with patient a pap with HPV cotesting is indicated at this time  I emphasized the importance of an annual pelvic and breast exam  Patient ok to have a pap done today  Encounter for screening mammogram for malignant neoplasm of breast  -     Mammo screening bilateral w 3d & cad; Future  A yearly mammogram is recommended for breast cancer screening starting at age 36; Well adult exam  -     Ambulatory Referral to [facility]; Future    Vasomotor symptoms due to menopause  Discussed the natural course of menopause & associated symptoms  Reviewed treatment options: allow natural course without treatment and implement lifestyle management strategies; hormone therapy, low dose serotonin reuptake inhibitors (like Brisdelle)  Reviewed meds like SSRI may help to stabilize moods along with lesson her hot flashes/nights sweats  Encourage trial of supplements with black cohosh like Remifemin or Estroven; Wear layers of clothing  Drink plenty of water  Avoid triggers:  caffeine, alcohol, spicy foods, stress    Discussion  I have discussed the importance of monthly self-breast exams, exercise and healthy diet as well as adequate intake of calcium and vitamin D  Encourage at least 1200 mg calcium citrate + 2000 IUs vitamin D3 divided through diet and supplement throughout the day; Encourage 30-40 min weight bearing exercise most days of week  STI testing - declines  In addition, colon cancer screening with a colonoscopy is recommended starting at age 36-53 and reviewed benefits - will request CRC initiative to search for results and recommended follow-up    All questions have been answered to her satisfaction  RTO for APE or sooner if needed    Subjective     HPI   Marlyn Fernandez is a 46 y o  female who presents for annual well woman exam as an old/np  10/2/18 - pelvic u/s WNL s/p hysterectomy - normal ovaries/no masses  LMP - 2015 s/p hysterectomy for menorrhagia and dysmenorrhea - pathology benign; Denies  bleeding  No vulvar itch/burn; No vaginal itch/burn; No abn discharge or odor; No urinary sx - burning/pain/frequency/hematuria  (+) SBEs - no breast masses, asymmetry, nipple discharge or bleeding, changes in skin of breast; left breast tenderness that comes and goes for years - seems to be worse - certain spots hurt more than others; some right breast tenderness but less than left; No abd/pelvic pain; since COVID has developed more significant Has, more fatigue- also had significant COVID infection at the beginning of this year after she has been vaccinated and boosted - needs to find a new PCP since her established one retired during [facility];   (+) menopausal symptoms: (+) hot flashes/night sweats - comes and goes in waves - seems to be bad now; some problems with intercourse, vaginal dryness and zero sex drive - IC is not often and when she does have it - uncomfortable and wants it to be over; sleeping poor - seems to wake up btw 3-4 and can't return back to sleep  Moods are up and down but unsure if related to the stress the past couple years with COVID or perimenopause; Doesn't like to take medications - not interested in hormones  Pt is sexually active in a mutually monog/ sexual relationship; She declines sti/hiv/hep testing; Feels safe at home  (-) PCP for routine Bw/care at this time;     Last Pap - around 2014 - WNL per patient - no results in EPIC  History of abnormal Pap smear: yes once, colpo likely done and normal since  Last mammo - 8/20/19 - WNL per patient - no results in EPIC  History of abnormal mammogram: no  Last colonoscopy - around 2017 - through [facility] - follow-up in about 5 years  Last Rafael scan - none    Review of Systems   Constitutional: Negative for activity change, fatigue, fever and unexpected weight change (have gained some weight)  HENT: Negative for congestion, dental problem, sinus pressure and sinus pain  Eyes: Negative for visual disturbance  Respiratory: Negative for cough, shortness of breath and wheezing  Cardiovascular: Negative for chest pain and leg swelling  Gastrointestinal: Negative for abdominal distention, abdominal pain, blood in stool, constipation, diarrhea, nausea and vomiting  Endocrine: Negative for polydipsia  Genitourinary: Negative for difficulty urinating, dyspareunia, dysuria, frequency, hematuria, menstrual problem, pelvic pain, urgency, vaginal bleeding, vaginal discharge and vaginal pain  Musculoskeletal: Negative for arthralgias and back pain  Allergic/Immunologic: Negative for environmental allergies  Neurological: Negative for dizziness, seizures and headaches  Psychiatric/Behavioral: Positive for dysphoric mood (up and down)  Negative for sleep disturbance  The patient is not nervous/anxious          The following portions of the patient's history were reviewed and updated as appropriate: allergies, current medications, past family history, past medical history, past social history, past surgical history and problem list          OB History        3    Para   1    Term   1            AB   2    Living           SAB   1    IAB   1    Ectopic        Multiple        Live Births               Obstetric Comments   : 80 VIP;  ;  SAB  Age at menarche 13  Age first live birth 40             Past Medical History:   Diagnosis Date    Genital warts     GERD (gastroesophageal reflux disease)     Hydroureteronephrosis     Skin cancer     removed 2020 from face    Varicella     as a baby       Past Surgical History:   Procedure Laterality Date    HYSTERECTOMY  2015    still has ovaries    IN CYSTO/URETERO/PYELOSCOPY, DX Left 2017    Procedure: URETEROSCOPY;  Surgeon: Evita Hayes MD;  [facility];  Service: Urology    WA CYSTOURETHROSCOPY,URETER CATHETER Left 2017    Procedure: CYSTOSCOPY RETROGRADE PYELOGRAM WITH INSERTION STENT URETERAL;  Surgeon: Evita Hayes MD;  [facility];  Service: Urology    WA [address] BLADDER,REIMPLNT URETER Left 2017    Procedure: OPEN URETERAL RE-IMPLANT ;  Surgeon: Evita Hayes MD;  [facility];  Service: Urology       Family History   Problem Relation Age of Onset    Multiple myeloma Mother     Stroke Sister     Multiple myeloma Maternal Grandfather     Pancreatic cancer Maternal Uncle     Stroke Paternal Grandfather        Social History     Socioeconomic History    Marital status: /Civil Union     Spouse name: Not on file    Number of children: Not on file    Years of education: Not on file    Highest education level: Not on file   Occupational History    Not on file   Tobacco Use    Smoking status: Former Smoker     Packs/day: 0 50     Years: 20 00     Pack years: 10 00     Quit date:      Years since quittin 1    Smokeless tobacco: Never Used    Tobacco comment: stopped 5 years   Vaping Use    Vaping Use: Never used   Substance and Sexual Activity    Alcohol use:  Yes     Alcohol/week: 6 0 standard drinks     Types: 6 Glasses of wine per week     Comment: social    Drug use: No    Sexual activity: Yes     Partners: Male     Birth control/protection: Female Sterilization   Other Topics Concern    Not on file   Social History Narrative    Not on file     Social Determinants of Health     Financial Resource Strain: Not on file   Food Insecurity: Not on file   Transportation Needs: Not on file   Physical Activity: Not on file   Stress: Not on file   Social Connections: Not on file   Intimate Partner Violence: Not on file   Housing Stability: Not on file         Current Outpatient Medications:     Multiple Vitamin (Multivitamin Adult) TABS, Take 1 capsule by mouth daily, Disp: , Rfl:     Allergies   Allergen Reactions    Penicillins Swelling and Hives     And Hives    Seasonal Ic [Cholestatin] Nasal Congestion       Objective   Vitals:    03/08/22 0837   BP: 102/68   BP Location: Left arm   Weight: 74 3 kg (163 lb 12 8 oz)   Height: 5' 5" (1 651 m)     Physical Exam  Vitals reviewed  Constitutional:       General: She is awake  She is not in acute distress  Appearance: Normal appearance  She is well-developed and well-groomed  She is not ill-appearing, toxic-appearing or diaphoretic  HENT:      Head: Normocephalic and atraumatic  Eyes:      Conjunctiva/sclera: Conjunctivae normal    Neck:      Thyroid: No thyroid mass, thyromegaly or thyroid tenderness  Cardiovascular:      Rate and Rhythm: Normal rate and regular rhythm  Heart sounds: Normal heart sounds  No murmur heard  Pulmonary:      Effort: Pulmonary effort is normal  No tachypnea, bradypnea or respiratory distress  Breath sounds: Normal breath sounds  No stridor or decreased air movement  No wheezing  Chest:   Breasts: Breasts are symmetrical       Right: Normal  No swelling, bleeding, inverted nipple, mass, nipple discharge, skin change, tenderness, axillary adenopathy or supraclavicular adenopathy  Left: Normal  No swelling, bleeding, inverted nipple, mass, nipple discharge, skin change, tenderness, axillary adenopathy or supraclavicular adenopathy  Abdominal:      General: There is no distension  Palpations: Abdomen is soft  There is no mass  Tenderness: There is no abdominal tenderness  Hernia: No hernia is present  There is no hernia in the left inguinal area or right inguinal area  Genitourinary:     General: Normal vulva  Exam position: Supine  Labia:         Right: No rash, tenderness, lesion or injury  Left: No rash, tenderness, lesion or injury  Urethra: No prolapse, urethral pain, urethral swelling or urethral lesion  Vagina: No signs of injury and foreign body  Tenderness (due to vaginal atrophy/dryness) present  No vaginal discharge, erythema, bleeding, lesions or prolapsed vaginal walls  Uterus: Absent  Rectum: Guaiac result negative  No mass, tenderness, anal fissure, external hemorrhoid or internal hemorrhoid  Normal anal tone  Comments: Uterus, cervix, and fallopian tubes surgically absent    Lymphadenopathy:      Cervical: No cervical adenopathy  Upper Body:      Right upper body: No supraclavicular or axillary adenopathy  Left upper body: No supraclavicular or axillary adenopathy  Lower Body: No right inguinal adenopathy  No left inguinal adenopathy  Skin:     General: Skin is warm and dry  Neurological:      Mental Status: She is alert and oriented to person, place, and time  Psychiatric:         Mood and Affect: Mood and affect normal          Speech: Speech normal          Behavior: Behavior normal  Behavior is cooperative  Thought Content:  Thought content normal          Judgment: Judgment normal

## 2022-03-08 NOTE — TELEPHONE ENCOUNTER
Upon review of the In Basket request we were able to locate, review, and update the patient chart as requested for CRC: Colonoscopy  Any additional questions or concerns should be emailed to the Practice Liaisons via [email or web address] email, please do not reply via In Basket      Thank you  Jocelyne Hanks MA

## 2022-03-10 LAB
CYTO CVX: NORMAL
CYTOLOGY CMNT CVX/VAG CYTO-IMP: NORMAL
DATE PREVIOUS BX: NORMAL
HPV E6+E7 MRNA CVX QL NAA+PROBE: NOT DETECTED
LMP START DATE: NORMAL
SL AMB PREV. PAP:: NORMAL
SPECIMEN SOURCE CVX/VAG CYTO: NORMAL
STAT OF ADQ CVX/VAG CYTO-IMP: NORMAL

## 2022-04-01 ENCOUNTER — HOSPITAL ENCOUNTER (OUTPATIENT)
Dept: MAMMOGRAPHY | Facility: HOSPITAL | Age: 52
Discharge: HOME | End: 2022-04-01
Payer: COMMERCIAL

## 2022-04-01 VITALS — WEIGHT: 163.8 LBS | HEIGHT: 65 IN | BODY MASS INDEX: 27.29 KG/M2

## 2022-04-01 DIAGNOSIS — Z12.31 ENCOUNTER FOR SCREENING MAMMOGRAM FOR MALIGNANT NEOPLASM OF BREAST: ICD-10-CM

## 2022-04-01 PROCEDURE — 77063 BREAST TOMOSYNTHESIS BI: CPT

## 2022-04-01 PROCEDURE — 77067 SCR MAMMO BI INCL CAD: CPT

## 2023-03-20 ENCOUNTER — OFFICE VISIT (OUTPATIENT)
Dept: FAMILY MEDICINE CLINIC | Facility: CLINIC | Age: 53
End: 2023-03-20

## 2023-03-20 VITALS
HEART RATE: 72 BPM | SYSTOLIC BLOOD PRESSURE: 118 MMHG | HEIGHT: 65 IN | RESPIRATION RATE: 16 BRPM | TEMPERATURE: 98 F | OXYGEN SATURATION: 96 % | WEIGHT: 158 LBS | DIASTOLIC BLOOD PRESSURE: 78 MMHG | BODY MASS INDEX: 26.33 KG/M2

## 2023-03-20 DIAGNOSIS — Z23 ENCOUNTER FOR IMMUNIZATION: ICD-10-CM

## 2023-03-20 DIAGNOSIS — G89.29 CHRONIC MIDLINE POSTERIOR NECK PAIN: ICD-10-CM

## 2023-03-20 DIAGNOSIS — M54.2 CHRONIC MIDLINE POSTERIOR NECK PAIN: ICD-10-CM

## 2023-03-20 DIAGNOSIS — R22.2 SUPRACLAVICULAR FOSSA FULLNESS: ICD-10-CM

## 2023-03-20 DIAGNOSIS — Z13.1 ENCOUNTER FOR SCREENING FOR DIABETES MELLITUS: ICD-10-CM

## 2023-03-20 DIAGNOSIS — J35.9 LESION OF TONSIL: ICD-10-CM

## 2023-03-20 DIAGNOSIS — Z11.59 NEED FOR HEPATITIS C SCREENING TEST: ICD-10-CM

## 2023-03-20 DIAGNOSIS — Z00.00 ANNUAL PHYSICAL EXAM: Primary | ICD-10-CM

## 2023-03-20 DIAGNOSIS — Z12.31 ENCOUNTER FOR SCREENING MAMMOGRAM FOR BREAST CANCER: ICD-10-CM

## 2023-03-20 PROBLEM — N13.30 HYDROURETERONEPHROSIS: Status: ACTIVE | Noted: 2017-04-28

## 2023-03-20 RX ORDER — TIZANIDINE 2 MG/1
2 TABLET ORAL EVERY 8 HOURS PRN
Qty: 20 TABLET | Refills: 0 | Status: SHIPPED | OUTPATIENT
Start: 2023-03-20

## 2023-03-20 RX ORDER — IBUPROFEN 800 MG/1
800 TABLET ORAL EVERY 8 HOURS PRN
Qty: 30 TABLET | Refills: 0 | Status: SHIPPED | OUTPATIENT
Start: 2023-03-20

## 2023-03-20 NOTE — PROGRESS NOTES
[facility]    NAME: Shamika Kinney  AGE: 46 y o  SEX: female  : 1970     DATE: 3/20/2023     Assessment and Plan:     Problem List Items Addressed This Visit    None  Visit Diagnoses     Annual physical exam    -  Primary    Relevant Orders    CBC and differential    Comprehensive metabolic panel    Lipid panel    TSH, 3rd generation with Free T4 reflex    Need for hepatitis C screening test        Relevant Orders    Hepatitis C Antibody ([facility])    Encounter for immunization        Relevant Orders    TDAP VACCINE GREATER THAN OR EQUAL TO 8YO IM (Completed)    Zoster Vaccine Recombinant IM (Completed)    Encounter for screening mammogram for breast cancer        Relevant Orders    Mammo screening bilateral w 3d & cad    Lesion of tonsil        Relevant Orders    Ambulatory Referral to Otolaryngology    Supraclavicular fossa fullness        Relevant Orders    US head neck soft tissue    Ambulatory Referral to Otolaryngology    Encounter for screening for diabetes mellitus        Relevant Orders    Hemoglobin A1C    Chronic midline posterior neck pain        Relevant Medications    ibuprofen (MOTRIN) 800 mg tablet    tiZANidine (ZANAFLEX) 2 mg tablet    Other Relevant Orders    XR spine cervical complete 4 or 5 vw non injury    Ambulatory Referral to Physical Therapy        Xray neck for chronic neck pain, advised to start PT, prn ibuprofen and tizanidine for pain  Check labs  Refer to ENT  Check US neck soft tissue for supraclavicular fossa -no lymphadenopathy noted -possible due to shoulder position    Tdap and shingrix today    Patient was scheduled for annual physical however patient did have EM problems that were addressed and is aware that they will get 2 separate bills, and is agreeable to conducting the visit  Immunizations and preventive care screenings were discussed with patient today  Appropriate education was printed on patient's after visit summary  Counseling:  Alcohol/drug use: discussed moderation in alcohol intake, the recommendations for healthy alcohol use, and avoidance of illicit drug use  Dental Health: discussed importance of regular tooth brushing, flossing, and dental visits  Injury prevention: discussed safety/seat belts, safety helmets, smoke detectors, carbon dioxide detectors, and smoking near bedding or upholstery  Sexual health: discussed sexually transmitted diseases, partner selection, use of condoms, avoidance of unintended pregnancy, and contraceptive alternatives  · Exercise: the importance of regular exercise/physical activity was discussed  Recommend exercise 3-5 times per week for at least 30 minutes  BMI Counseling: Body mass index is 26 29 kg/m²  The BMI is above normal  Nutrition recommendations include decreasing portion sizes, encouraging healthy choices of fruits and vegetables, decreasing fast food intake, consuming healthier snacks, limiting drinks that contain sugar, moderation in carbohydrate intake and reducing intake of cholesterol  Exercise recommendations include moderate physical activity 150 minutes/week  No pharmacotherapy was ordered  Rationale for BMI follow-up plan is due to patient being overweight or obese  Depression Screening and Follow-up Plan: Patient was screened for depression during today's encounter  They screened negative with a PHQ-2 score of 1  No follow-ups on file       Chief Complaint:     Chief Complaint   Patient presents with   • Establish Care   • Panic Attack   • Neck Pain   • Physical Exam      History of Present Illness:     Adult Annual Physical   Patient here for a comprehensive physical exam  The patient reports problems - several problems addressed- lesion on tonsil- seen at urgent care, told to take OTC stuff, noticed several months ago, has left sided neck swelling, chronic neck pain and sometimes numbness of both upper extremities when wakes up from sleep, friend was diagnosed with metastatic cancer hence had a panic attack, anxious- social- daughter is 12 yo and is transgender  Diet and Physical Activity  · Diet/Nutrition: well balanced diet and consuming 3-5 servings of fruits/vegetables daily  · Exercise: no formal exercise  Depression Screening  PHQ-2/9 Depression Screening    Little interest or pleasure in doing things: 0 - not at all  Feeling down, depressed, or hopeless: 1 - several days  PHQ-2 Score: 1  PHQ-2 Interpretation: Negative depression screen       General Health  · Sleep: sleeps poorly  · Hearing: grossly normal   · Vision: goes for regular eye exams and wears glasses  · Dental: regular dental visits  /GYN Health  · Patient is: surgical menopause  · Last menstrual period: No LMP recorded  Patient has had a hysterectomy  ·   ·      Review of Systems:     Review of Systems   Constitutional: Negative for fatigue and fever  HENT: Negative for congestion, facial swelling, mouth sores, rhinorrhea, sore throat and trouble swallowing  Eyes: Negative for pain and redness  Respiratory: Negative for cough, shortness of breath and wheezing  Cardiovascular: Negative for chest pain, palpitations and leg swelling  Gastrointestinal: Negative for abdominal pain, blood in stool, constipation, diarrhea and nausea  Genitourinary: Negative for dysuria, hematuria and urgency  Musculoskeletal: Positive for neck pain  Negative for arthralgias, back pain and myalgias  Skin: Negative for rash and wound  Neurological: Negative for seizures, syncope and headaches  Hematological: Negative for adenopathy  Psychiatric/Behavioral: Negative for agitation and behavioral problems  The patient is nervous/anxious         Past Medical History:     Past Medical History:   Diagnosis Date   • Allergic     Seasonal   • Anxiety    • [facility]     Skin cancer removed from forehead February 2020   • Genital warts 1992   • GERD (gastroesophageal reflux disease)    • Hydroureteronephrosis    • Skin cancer     removed 2 2020 from face   • Varicella     as a baby      Past Surgical History:     Past Surgical History:   Procedure Laterality Date   • HYSTERECTOMY  04/01/2015    still has ovaries   • MA CSTC PRTL W/RIMPLTJ URTR IN BLDR SEVEN Left 5/24/2017    Procedure: OPEN URETERAL RE-IMPLANT ;  Surgeon: Sravanthi Tai MD;  [facility];  Service: Urology   • MA CYSTO BLADDER W/URETERAL CATHETERIZATION Left 5/9/2017    Procedure: CYSTOSCOPY RETROGRADE PYELOGRAM WITH INSERTION STENT URETERAL;  Surgeon: Sravanthi Tai MD;  [facility];  Service: Urology   • MA CYSTO W/URTROSCOPY&/PYELOSCOPY DX Left 5/9/2017    Procedure: URETEROSCOPY;  Surgeon: Sravanthi Tai MD;  [facility];  Service: Urology      Social History:     Social History     Socioeconomic History   • Marital status: /Civil Union     Spouse name: None   • Number of children: None   • Years of education: None   • Highest education level: None   Occupational History   • None   Tobacco Use   • Smoking status: Former     Packs/day: 0 50     Years: 20 00     Pack years: 10 00     Types: Cigarettes     Quit date: 1/1/2013     Years since quitting: 10 2   • Smokeless tobacco: Never   • Tobacco comments:     stopped 5 years   Vaping Use   • Vaping Use: Never used   Substance and Sexual Activity   • Alcohol use:  Yes     Alcohol/week: 6 0 standard drinks     Types: 6 Glasses of wine per week     Comment: social   • Drug use: Not Currently     Types: Marijuana   • Sexual activity: Yes     Partners: Male     Birth control/protection: Female Sterilization   Other Topics Concern   • None   Social History Narrative   • None     Social Determinants of Health     Financial Resource Strain: Not on file   Food Insecurity: Not on file   Transportation Needs: Not on file   Physical Activity: Not on file Stress: Not on file   Social Connections: Not on file   Intimate Partner Violence: Not on file   Housing Stability: Not on file      Family History:     Family History   Problem Relation Age of Onset   • Multiple myeloma Mother    • Cancer Mother         Multiple Myeloma   • Depression Mother    • Stroke Sister    • Depression Sister    • Multiple myeloma Maternal Grandfather    • Pancreatic cancer Maternal Uncle    • Stroke Paternal Grandfather       Current Medications:     Current Outpatient Medications   Medication Sig Dispense Refill   • ibuprofen (MOTRIN) 800 mg tablet Take 1 tablet (800 mg total) by mouth every 8 (eight) hours as needed for mild pain or moderate pain 30 tablet 0   • Multiple Vitamin (Multivitamin Adult) TABS Take 1 capsule by mouth daily     • tiZANidine (ZANAFLEX) 2 mg tablet Take 1 tablet (2 mg total) by mouth every 8 (eight) hours as needed for muscle spasms 20 tablet 0     No current facility-administered medications for this visit  Allergies: Allergies   Allergen Reactions   • Penicillins Swelling and Hives     And Hives   • Seasonal Ic [Cholestatin] Nasal Congestion      Physical Exam:     /78 (BP Location: Left arm, Patient Position: Sitting, Cuff Size: Adult)   Pulse 72   Temp 98 °F (36 7 °C) (Tympanic)   Resp 16   Ht 5' 5" (1 651 m)   Wt 71 7 kg (158 lb)   SpO2 96%   BMI 26 29 kg/m²     Physical Exam  Vitals and nursing note reviewed  Constitutional:       Appearance: She is well-developed  HENT:      Head: Normocephalic and atraumatic  Right Ear: Tympanic membrane, ear canal and external ear normal       Left Ear: Tympanic membrane, ear canal and external ear normal       Nose: Nose normal       Mouth/Throat:      Pharynx: No oropharyngeal exudate  Tonsils: 1+ on the right  1+ on the left        Comments: Small well circumscribed, circular lesion on upper pole of left tonsil-likley cystic lesion, size of a small pea  Eyes:      General: No scleral icterus  Right eye: No discharge  Left eye: No discharge  Conjunctiva/sclera: Conjunctivae normal       Pupils: Pupils are equal, round, and reactive to light  Neck:      Thyroid: No thyromegaly  Comments: Left supraclavicular fossa fullness noted  Cardiovascular:      Rate and Rhythm: Normal rate and regular rhythm  Heart sounds: No murmur heard  No gallop  Pulmonary:      Effort: Pulmonary effort is normal  No respiratory distress  Breath sounds: Normal breath sounds  No wheezing or rales  Abdominal:      Palpations: Abdomen is soft  Tenderness: There is no abdominal tenderness  Musculoskeletal:         General: No tenderness or deformity  Cervical back: Normal range of motion  Right lower leg: No edema  Left lower leg: No edema  Lymphadenopathy:      Cervical: No cervical adenopathy  Skin:     General: Skin is warm  Capillary Refill: Capillary refill takes less than 2 seconds  Findings: No erythema or rash  Neurological:      Mental Status: She is alert and oriented to person, place, and time  Gait: Gait normal       Deep Tendon Reflexes: Reflexes normal    Psychiatric:         Mood and Affect: Mood is anxious  Affect is tearful  Behavior: Behavior normal          Thought Content:  Thought content normal          Judgment: Judgment normal           Rhett Camarillo MD  [address] [place] [address]

## 2023-03-20 NOTE — PROGRESS NOTES
Subjective:      Patient ID: Aria Spencer is a 46 y o  female  Patient was scheduled for annual physical however patient did have EM problems that were addressed and is aware that they will get 2 separate bills, and is agreeable to conducting the visit  problems - several problems addressed- lesion on tonsil- seen at urgent care, told to take OTC stuff, noticed several months ago, has left sided neck swelling, chronic neck pain and sometimes numbness of both upper extremities when wakes up from sleep, friend was diagnosed with metastatic cancer hence had a panic attack, anxious- social- daughter is 14 yo and is transgender    Panic Attack  Associated symptoms include neck pain     Neck Pain           Past Medical History:   Diagnosis Date   • Allergic     Seasonal   • Anxiety    • Cancer [facility])     Skin cancer removed from forehead February 2020   • Genital warts 1992   • GERD (gastroesophageal reflux disease)    • Hydroureteronephrosis    • Skin cancer     removed 2 2020 from face   • Varicella     as a baby       Family History   Problem Relation Age of Onset   • Multiple myeloma Mother    • Cancer Mother         Multiple Myeloma   • Depression Mother    • Stroke Sister    • Depression Sister    • Multiple myeloma Maternal Grandfather    • Pancreatic cancer Maternal Uncle    • Stroke Paternal Grandfather        Past Surgical History:   Procedure Laterality Date   • HYSTERECTOMY  04/01/2015    still has ovaries   • HI CSTC PRTL W/RIMPLTJ URTR IN BLDR URTRONEOCSTOST Left 5/24/2017    Procedure: OPEN URETERAL RE-IMPLANT ;  Surgeon: Evita Hayes MD;  [facility];  Service: Urology   • HI CYSTO BLADDER W/URETERAL CATHETERIZATION Left 5/9/2017    Procedure: CYSTOSCOPY RETROGRADE PYELOGRAM WITH INSERTION STENT URETERAL;  Surgeon: Evita Hayes MD;  [facility];  Service: Urology   • HI CYSTO W/URTROSCOPY&/PYELOSCOPY DX Left 5/9/2017    Procedure: URETEROSCOPY;  Surgeon: Rogerio Boas Edward Scott MD;  [facility];  Service: Urology        reports that she quit smoking about 10 years ago  Her smoking use included cigarettes  She has a 10 00 pack-year smoking history  She has never used smokeless tobacco  She reports current alcohol use of about 6 0 standard drinks per week  She reports that she does not currently use drugs after having used the following drugs: Marijuana  Current Outpatient Medications:   •  ibuprofen (MOTRIN) 800 mg tablet, Take 1 tablet (800 mg total) by mouth every 8 (eight) hours as needed for mild pain or moderate pain, Disp: 30 tablet, Rfl: 0  •  Multiple Vitamin (Multivitamin Adult) TABS, Take 1 capsule by mouth daily, Disp: , Rfl:   •  tiZANidine (ZANAFLEX) 2 mg tablet, Take 1 tablet (2 mg total) by mouth every 8 (eight) hours as needed for muscle spasms, Disp: 20 tablet, Rfl: 0    The following portions of the patient's history were reviewed and updated as appropriate: allergies, current medications, past family history, past medical history, past social history, past surgical history and problem list       Review of Systems   Constitutional: Negative for fatigue and fever  HENT: Negative for congestion, facial swelling, mouth sores, rhinorrhea, sore throat and trouble swallowing  Eyes: Negative for pain and redness  Respiratory: Negative for cough, shortness of breath and wheezing  Cardiovascular: Negative for chest pain, palpitations and leg swelling  Gastrointestinal: Negative for abdominal pain, blood in stool, constipation, diarrhea and nausea  Genitourinary: Negative for dysuria, hematuria and urgency  Musculoskeletal: Positive for neck pain  Negative for arthralgias, back pain and myalgias  Skin: Negative for rash and wound  Neurological: Negative for seizures, syncope and headaches  Hematological: Negative for adenopathy  Psychiatric/Behavioral: Negative for agitation and behavioral problems  The patient is nervous/anxious  PHQ-2/9 Depression Screening    Little interest or pleasure in doing things: 0 - not at all  Feeling down, depressed, or hopeless: 1 - several days  PHQ-2 Score: 1  PHQ-2 Interpretation: Negative depression screen             Objective:    /78 (BP Location: Left arm, Patient Position: Sitting, Cuff Size: Adult)   Pulse 72   Temp 98 °F (36 7 °C) (Tympanic)   Resp 16   Ht 5' 5" (1 651 m)   Wt 71 7 kg (158 lb)   SpO2 96%   BMI 26 29 kg/m²        Physical Exam  Vitals and nursing note reviewed  Constitutional:       Appearance: She is well-developed  HENT:      Head: Normocephalic and atraumatic  Right Ear: Tympanic membrane, ear canal and external ear normal       Left Ear: Tympanic membrane, ear canal and external ear normal       Nose: Nose normal       Mouth/Throat:      Pharynx: No oropharyngeal exudate  Tonsils: 1+ on the right  1+ on the left  Comments: Small well circumscribed, circular lesion on upper pole of left tonsil-likley cystic lesion, size of a small pea  Eyes:      General: No scleral icterus  Right eye: No discharge  Left eye: No discharge  Conjunctiva/sclera: Conjunctivae normal       Pupils: Pupils are equal, round, and reactive to light  Neck:      Thyroid: No thyromegaly  Comments: Left supraclavicular fossa fullness noted  Cardiovascular:      Rate and Rhythm: Normal rate and regular rhythm  Heart sounds: No murmur heard  No gallop  Pulmonary:      Effort: Pulmonary effort is normal  No respiratory distress  Breath sounds: Normal breath sounds  No wheezing or rales  Abdominal:      Palpations: Abdomen is soft  Tenderness: There is no abdominal tenderness  Musculoskeletal:         General: No tenderness or deformity  Cervical back: Normal range of motion  Right lower leg: No edema  Left lower leg: No edema  Lymphadenopathy:      Cervical: No cervical adenopathy     Skin:     General: Skin is warm       Capillary Refill: Capillary refill takes less than 2 seconds  Findings: No erythema or rash  Neurological:      Mental Status: She is alert and oriented to person, place, and time  Gait: Gait normal       Deep Tendon Reflexes: Reflexes normal    Psychiatric:         Mood and Affect: Mood is anxious  Affect is tearful  Behavior: Behavior normal          Thought Content: Thought content normal          Judgment: Judgment normal          Radiology/Other Diagnostic Testing Results: I have personally reviewed pertinent reports  Mammo screening bilateral w 3d & cad    Result Date: 4/4/2022  DIAGNOSIS: Encounter for screening mammogram for malignant neoplasm of breast TECHNIQUE: Digital screening mammography was performed  Computer Aided Detection (CAD) analyzed all applicable images  COMPARISONS: Prior breast imaging dated: 08/20/2019, 04/09/2018, 04/04/2016, 12/16/2013, and 05/01/2012 RELEVANT HISTORY: Family Breast Cancer History: No known family history of breast cancer  Family Medical History: No known relevant family medical history  Personal History: No known relevant hormone history  Surgical history includes hysterectomy  No known relevant medical history  The patient is scheduled in a reminder system for screening mammography  8-10% of cancers will be missed on mammography  Management of a palpable abnormality must be based on clinical grounds  Patients will be notified of their results via letter from our facility  Accredited by [employer] of Radiology and FDA  RISK ASSESSMENT: 5 Year Tyrer-Cuzick: 1 13 % 10 Year Tyrer-Cuzick: 2 41 % Lifetime Tyrer-Cuzick: 9 84 % TISSUE DENSITY: There are scattered areas of fibroglandular density  INDICATION: Le Ford is a 46 y o  female presenting for screening mammography  FINDINGS: Bilateral There are no suspicious masses, grouped microcalcifications or areas of unexplained architectural distortion   The skin and nipple areolar complex are unremarkable  Benign-appearing calcifications are noted  No mammographic evidence of malignancy  ASSESSMENT/BI-RADS CATEGORY: Left: 2 - Benign Right: 2 - Benign Overall: 2 - Benign RECOMMENDATION:      - Routine screening mammogram in 1 year for both breasts  Workstation ID: [id number]        Assessment/Plan:  Problem List Items Addressed This Visit    None  Visit Diagnoses     Annual physical exam    -  Primary    Relevant Orders    CBC and differential    Comprehensive metabolic panel    Lipid panel    TSH, 3rd generation with Free T4 reflex    Need for hepatitis C screening test        Relevant Orders    Hepatitis C Antibody ([facility])    Encounter for immunization        Relevant Orders    TDAP VACCINE GREATER THAN OR EQUAL TO 6YO IM (Completed)    Zoster Vaccine Recombinant IM (Completed)    Encounter for screening mammogram for breast cancer        Relevant Orders    Mammo screening bilateral w 3d & cad    Lesion of tonsil        Relevant Orders    Ambulatory Referral to Otolaryngology    Supraclavicular fossa fullness        Relevant Orders    US head neck soft tissue    Ambulatory Referral to Otolaryngology    Encounter for screening for diabetes mellitus        Relevant Orders    Hemoglobin A1C    Chronic midline posterior neck pain        Relevant Medications    ibuprofen (MOTRIN) 800 mg tablet    tiZANidine (ZANAFLEX) 2 mg tablet    Other Relevant Orders    XR spine cervical complete 4 or 5 vw non injury    Ambulatory Referral to Physical Therapy            Xray neck for chronic neck pain, advised to start PT, prn ibuprofen and tizanidine for pain  Check labs  Refer to ENT  Check US neck soft tissue for supraclavicular fossa -no lymphadenopathy noted -possible due to shoulder position           Read package inserts for all medications before starting a new medications, call me if you have any questions      Patient was given opportunity to ask questions and all questions were answered  Disclaimer: Portions of the record may have been created with voice recognition software  Occasional wrong word or "sound a like" substitutions may have occurred due to the inherent limitations of voice recognition software  Read the chart carefully and recognize, using context, where substitutions have occurred  I have used the Epic copy/forward function to compose this note  I have reviewed my current note to ensure it reflects the current patient status, exam, assessment and plan

## 2023-03-23 LAB
HBA1C MFR BLD HPLC: 5.5 %
HCV AB SER-ACNC: NEGATIVE

## 2023-03-27 ENCOUNTER — HOSPITAL ENCOUNTER (OUTPATIENT)
Dept: ULTRASOUND IMAGING | Facility: HOSPITAL | Age: 53
Discharge: HOME | End: 2023-03-27
Attending: FAMILY MEDICINE

## 2023-03-27 ENCOUNTER — HOSPITAL ENCOUNTER (OUTPATIENT)
Dept: RADIOLOGY | Facility: HOSPITAL | Age: 53
Discharge: HOME | End: 2023-03-27
Attending: FAMILY MEDICINE

## 2023-03-27 DIAGNOSIS — G89.29 CHRONIC MIDLINE POSTERIOR NECK PAIN: ICD-10-CM

## 2023-03-27 DIAGNOSIS — R22.2 SUPRACLAVICULAR FOSSA FULLNESS: ICD-10-CM

## 2023-03-27 DIAGNOSIS — M54.2 CHRONIC MIDLINE POSTERIOR NECK PAIN: ICD-10-CM

## 2023-04-04 DIAGNOSIS — E78.00 ELEVATED CHOLESTEROL: Primary | ICD-10-CM

## 2023-05-04 ENCOUNTER — HOSPITAL ENCOUNTER (OUTPATIENT)
Dept: CT IMAGING | Facility: HOSPITAL | Age: 53
Discharge: HOME | End: 2023-05-04

## 2023-05-04 DIAGNOSIS — R07.0 THROAT PAIN: ICD-10-CM

## 2023-05-04 DIAGNOSIS — J35.9 LESION OF TONSIL: ICD-10-CM

## 2023-05-04 RX ADMIN — IOHEXOL 85 ML: 350 INJECTION, SOLUTION INTRAVENOUS at 08:10

## 2023-05-17 ENCOUNTER — HOSPITAL ENCOUNTER (OUTPATIENT)
Dept: RADIOLOGY | Age: 53
Discharge: HOME | End: 2023-05-17

## 2023-05-17 VITALS — BODY MASS INDEX: 26.33 KG/M2 | WEIGHT: 158 LBS | HEIGHT: 65 IN

## 2023-05-17 DIAGNOSIS — Z12.31 ENCOUNTER FOR SCREENING MAMMOGRAM FOR BREAST CANCER: ICD-10-CM

## 2023-06-29 ENCOUNTER — HOSPITAL ENCOUNTER (OUTPATIENT)
Dept: ULTRASOUND IMAGING | Facility: CLINIC | Age: 53
End: 2023-06-29
Payer: COMMERCIAL

## 2023-06-29 ENCOUNTER — HOSPITAL ENCOUNTER (OUTPATIENT)
Dept: MAMMOGRAPHY | Facility: CLINIC | Age: 53
End: 2023-06-29
Payer: COMMERCIAL

## 2023-06-29 VITALS — HEIGHT: 65 IN | BODY MASS INDEX: 26.33 KG/M2 | WEIGHT: 158 LBS

## 2023-06-29 DIAGNOSIS — R92.8 ABNORMAL MAMMOGRAM: ICD-10-CM

## 2023-06-29 PROCEDURE — 77065 DX MAMMO INCL CAD UNI: CPT

## 2023-06-29 PROCEDURE — G0279 TOMOSYNTHESIS, MAMMO: HCPCS

## 2023-06-29 PROCEDURE — 76642 ULTRASOUND BREAST LIMITED: CPT

## 2024-02-21 PROBLEM — Z01.419 ENCOUNTER FOR GYNECOLOGICAL EXAMINATION (GENERAL) (ROUTINE) WITHOUT ABNORMAL FINDINGS: Status: RESOLVED | Noted: 2022-03-08 | Resolved: 2024-02-21

## 2024-05-06 ENCOUNTER — OFFICE VISIT (OUTPATIENT)
Dept: FAMILY MEDICINE CLINIC | Facility: CLINIC | Age: 54
End: 2024-05-06
Payer: COMMERCIAL

## 2024-05-06 VITALS
SYSTOLIC BLOOD PRESSURE: 130 MMHG | RESPIRATION RATE: 16 BRPM | DIASTOLIC BLOOD PRESSURE: 80 MMHG | BODY MASS INDEX: 27.82 KG/M2 | WEIGHT: 167 LBS | HEIGHT: 65 IN | OXYGEN SATURATION: 97 % | HEART RATE: 68 BPM

## 2024-05-06 DIAGNOSIS — M79.601 PARESTHESIA AND PAIN OF BOTH UPPER EXTREMITIES: ICD-10-CM

## 2024-05-06 DIAGNOSIS — R20.2 PARESTHESIA AND PAIN OF BOTH UPPER EXTREMITIES: ICD-10-CM

## 2024-05-06 DIAGNOSIS — F10.10 ALCOHOL ABUSE, DAILY USE: ICD-10-CM

## 2024-05-06 DIAGNOSIS — Z00.00 ANNUAL PHYSICAL EXAM: Primary | ICD-10-CM

## 2024-05-06 DIAGNOSIS — Z13.1 SCREENING FOR DIABETES MELLITUS: ICD-10-CM

## 2024-05-06 DIAGNOSIS — B37.2 CANDIDAL INTERTRIGO: ICD-10-CM

## 2024-05-06 DIAGNOSIS — Z13.6 SCREENING FOR CARDIOVASCULAR CONDITION: ICD-10-CM

## 2024-05-06 DIAGNOSIS — F32.0 CURRENT MILD EPISODE OF MAJOR DEPRESSIVE DISORDER WITHOUT PRIOR EPISODE (HCC): ICD-10-CM

## 2024-05-06 DIAGNOSIS — C44.91 BASAL CELL CARCINOMA (BCC), UNSPECIFIED SITE: ICD-10-CM

## 2024-05-06 DIAGNOSIS — Z12.31 ENCOUNTER FOR SCREENING MAMMOGRAM FOR BREAST CANCER: ICD-10-CM

## 2024-05-06 DIAGNOSIS — M79.602 PARESTHESIA AND PAIN OF BOTH UPPER EXTREMITIES: ICD-10-CM

## 2024-05-06 DIAGNOSIS — Z23 ENCOUNTER FOR IMMUNIZATION: ICD-10-CM

## 2024-05-06 DIAGNOSIS — E55.9 VITAMIN D DEFICIENCY: ICD-10-CM

## 2024-05-06 DIAGNOSIS — M25.50 POLYARTHRALGIA: ICD-10-CM

## 2024-05-06 DIAGNOSIS — M19.049 HAND ARTHRITIS: ICD-10-CM

## 2024-05-06 PROBLEM — R20.0 NUMBNESS AND TINGLING IN BOTH HANDS: Status: ACTIVE | Noted: 2023-06-01

## 2024-05-06 PROCEDURE — 99396 PREV VISIT EST AGE 40-64: CPT | Performed by: FAMILY MEDICINE

## 2024-05-06 PROCEDURE — 90750 HZV VACC RECOMBINANT IM: CPT | Performed by: FAMILY MEDICINE

## 2024-05-06 PROCEDURE — 99214 OFFICE O/P EST MOD 30 MIN: CPT | Performed by: FAMILY MEDICINE

## 2024-05-06 PROCEDURE — 90471 IMMUNIZATION ADMIN: CPT | Performed by: FAMILY MEDICINE

## 2024-05-06 RX ORDER — CLOTRIMAZOLE 1 %
CREAM (GRAM) TOPICAL 2 TIMES DAILY
Qty: 45 G | Refills: 0 | Status: SHIPPED | OUTPATIENT
Start: 2024-05-06

## 2024-05-06 RX ORDER — LANOLIN ALCOHOL/MO/W.PET/CERES
400 CREAM (GRAM) TOPICAL DAILY
Qty: 30 TABLET | Refills: 1 | Status: SHIPPED | OUTPATIENT
Start: 2024-05-06

## 2024-05-06 RX ORDER — NYSTATIN 100000 [USP'U]/G
POWDER TOPICAL 3 TIMES DAILY
Qty: 60 G | Refills: 2 | Status: SHIPPED | OUTPATIENT
Start: 2024-05-06

## 2024-05-06 NOTE — PROGRESS NOTES
ADULT ANNUAL PHYSICAL  [facility]    NAME: Roxana Huffman  AGE: 53 y.o. SEX: female  : 1970     DATE: 2024     Assessment and Plan:     Problem List Items Addressed This Visit    None  Visit Diagnoses       Annual physical exam    -  Primary    Relevant Orders    TSH, 3rd generation with Free T4 reflex    Comprehensive metabolic panel    CBC and differential    Encounter for screening mammogram for breast cancer        Relevant Orders    Mammo screening bilateral w 3d & cad    Paresthesia and pain of both upper extremities        Relevant Medications    magnesium Oxide (MAG-OX) 400 mg TABS    Other Relevant Orders    EMG 2 Limb Upper Extremity    Vitamin B12    Folate    Vitamin B1, whole blood    Polyarthralgia        Relevant Medications    magnesium Oxide (MAG-OX) 400 mg TABS    Other Relevant Orders    AMBROSIO Screen w/ Reflex to Titer/Pattern    RF Screen w/ Reflex to Titer    Uric acid    Sedimentation rate, automated    C-reactive protein    TSH, 3rd generation with Free T4 reflex    XR hand 3+ vw right    XR hand 3+ vw left    Screening for diabetes mellitus        Relevant Orders    Hemoglobin A1C    Screening for cardiovascular condition        Relevant Orders    Lipid panel    Hand arthritis        Relevant Orders    XR hand 3+ vw right    XR hand 3+ vw left    Candidal intertrigo        Relevant Medications    nystatin (MYCOSTATIN) powder    clotrimazole (LOTRIMIN) 1 % cream    Alcohol abuse, daily use        Vitamin D deficiency        Relevant Orders    Vitamin D 25 hydroxy    Basal cell carcinoma (BCC), unspecified site        Relevant Medications    nystatin (MYCOSTATIN) powder    clotrimazole (LOTRIMIN) 1 % cream    Other Relevant Orders    Ambulatory Referral to Dermatology    Encounter for immunization        Relevant Orders    Zoster Vaccine Recombinant IM (Completed)    Current mild episode of major depressive  disorder without prior episode (HCC)        Relevant Orders    Ambulatory referral to Psych Services            Refer to Therapist for mild depression.    Check labs as above for arthritis.  Likely carpal tunnel and osteoarthritis of hands and wrist.  Ibuprofen 800 mg tid prn for pain  Discussed that untreated depression, alcohol use likely is causing cognitive decline , recommend vitamin levels check and to take daily multivitamins palma b-complex.    Patient was scheduled for annual physical however patient did have EM problems that were addressed and is aware that they will get 2 separate bills, and is agreeable to conducting the visit.    Immunizations and preventive care screenings were discussed with patient today. Appropriate education was printed on patient's after visit summary.    Counseling:  Alcohol/drug use: discussed moderation in alcohol intake, the recommendations for healthy alcohol use, and avoidance of illicit drug use.  Dental Health: discussed importance of regular tooth brushing, flossing, and dental visits.  Injury prevention: discussed safety/seat belts, safety helmets, smoke detectors, carbon dioxide detectors, and smoking near bedding or upholstery.  Sexual health: discussed sexually transmitted diseases, partner selection, use of condoms, avoidance of unintended pregnancy, and contraceptive alternatives.  Exercise: the importance of regular exercise/physical activity was discussed. Recommend exercise 3-5 times per week for at least 30 minutes.          Return in about 3 months (around 8/6/2024) for Next scheduled follow up.     Chief Complaint:     Chief Complaint   Patient presents with    Physical Exam      History of Present Illness:     Adult Annual Physical   Patient here for a comprehensive physical exam. The patient reports problems - has been feeling depressed, also has joint swelling of fingers, knuckles and wrist of both hands, has also been dring 2 or more glasses of alcohol  daily, feels mental foginess .    Diet and Physical Activity  Diet/Nutrition: well balanced diet and consuming 3-5 servings of fruits/vegetables daily.   Exercise: walking.      Depression Screening  PHQ-2/9 Depression Screening    Little interest or pleasure in doing things: 1 - several days  Feeling down, depressed, or hopeless: 1 - several days  PHQ-2 Score: 2  PHQ-2 Interpretation: Negative depression screen       General Health  Sleep: sleeps poorly.   Hearing:  grossly normal .  Vision: goes for regular eye exams and wears glasses.   Dental: regular dental visits.       /GYN Health  Follows with gynecology? yes  Patient is: postmenopausal  Last menstrual period: No LMP recorded. Patient has had a hysterectomy.        Advanced Care Planning  Do you have an advanced directive? no  Do you have a durable medical power of ? no  ACP document given to the patient? no     Review of Systems:     Review of Systems   Constitutional:  Negative for fatigue and fever.   HENT:  Negative for congestion, facial swelling, mouth sores, rhinorrhea, sore throat and trouble swallowing.    Eyes:  Negative for pain and redness.   Respiratory:  Negative for cough, shortness of breath and wheezing.    Cardiovascular:  Negative for chest pain, palpitations and leg swelling.   Gastrointestinal:  Negative for abdominal pain, blood in stool, constipation, diarrhea and nausea.   Genitourinary:  Negative for dysuria, hematuria and urgency.   Musculoskeletal:  Positive for arthralgias and joint swelling. Negative for back pain and myalgias.   Skin:  Positive for rash (under the breast). Negative for wound.   Neurological:  Negative for seizures, syncope and headaches.        Mental fog+   Hematological:  Negative for adenopathy.   Psychiatric/Behavioral:  Negative for agitation and behavioral problems.       Past Medical History:     Past Medical History:   Diagnosis Date    Allergic     Seasonal    Anxiety     Cancer (HCC)     Skin  cancer removed from forehead 2020    Genital warts     GERD (gastroesophageal reflux disease)     Hydroureteronephrosis     Skin cancer     removed 2020 from face    Varicella     as a baby      Past Surgical History:     Past Surgical History:   Procedure Laterality Date    HYSTERECTOMY  2015    still has ovaries    WA CSTC PRTL W/RIMPLTJ URTR IN BLDR URFIORELLAEJABIERTOST Left 2017    Procedure: OPEN URETERAL RE-IMPLANT ;  Surgeon: Darryl Azevedo MD;  [facility];  Service: Urology    WA CYSTO BLADDER W/URETERAL CATHETERIZATION Left 2017    Procedure: CYSTOSCOPY RETROGRADE PYELOGRAM WITH INSERTION STENT URETERAL;  Surgeon: Darryl Azevedo MD;  [facility];  Service: Urology    WA CYSTO W/URTROSCOPY&/PYELOSCOPY DX Left 2017    Procedure: URETEROSCOPY;  Surgeon: Darryl Azevedo MD;  [facility];  Service: Urology      Social History:     Social History     Socioeconomic History    Marital status: /Civil Union     Spouse name: None    Number of children: None    Years of education: None    Highest education level: None   Occupational History    None   Tobacco Use    Smoking status: Former     Current packs/day: 0.00     Average packs/day: 0.5 packs/day for 20.0 years (10.0 ttl pk-yrs)     Types: Cigarettes     Start date: 1993     Quit date: 2013     Years since quittin.3    Smokeless tobacco: Never    Tobacco comments:     stopped 5 years   Vaping Use    Vaping status: Never Used   Substance and Sexual Activity    Alcohol use: Yes     Alcohol/week: 6.0 standard drinks of alcohol     Types: 6 Glasses of wine per week     Comment: social    Drug use: Not Currently     Types: Marijuana    Sexual activity: Yes     Partners: Male     Birth control/protection: Female Sterilization   Other Topics Concern    None   Social History Narrative    None     Social Determinants of Health     Financial Resource Strain: Not on file   Food Insecurity:  "Not on file   Transportation Needs: Not on file   Physical Activity: Not on file   Stress: Not on file   Social Connections: Not on file   Intimate Partner Violence: Not on file   Housing Stability: Not on file      Family History:     Family History   Problem Relation Age of Onset    Multiple myeloma Mother     Cancer Mother         Multiple Myeloma    Depression Mother     Stroke Sister     Depression Sister     Multiple myeloma Maternal Grandfather     Pancreatic cancer Maternal Uncle     Stroke Paternal Grandfather       Current Medications:     Current Outpatient Medications   Medication Sig Dispense Refill    clotrimazole (LOTRIMIN) 1 % cream Apply topically 2 (two) times a day 45 g 0    magnesium Oxide (MAG-OX) 400 mg TABS Take 1 tablet (400 mg total) by mouth daily 30 tablet 1    Multiple Vitamin (Multivitamin Adult) TABS Take 1 capsule by mouth daily      nystatin (MYCOSTATIN) powder Apply topically 3 (three) times a day 60 g 2    ibuprofen (MOTRIN) 800 mg tablet Take 1 tablet (800 mg total) by mouth every 8 (eight) hours as needed for mild pain or moderate pain (Patient not taking: Reported on 5/6/2024) 30 tablet 0     No current facility-administered medications for this visit.      Allergies:     Allergies   Allergen Reactions    Penicillins Swelling and Hives     And Hives    Seasonal Ic [Cholestatin] Nasal Congestion      Physical Exam:     /80 (BP Location: Left arm, Patient Position: Sitting, Cuff Size: Standard)   Pulse 68   Resp 16   Ht 5' 5\" (1.651 m)   Wt 75.8 kg (167 lb)   SpO2 97%   BMI 27.79 kg/m²     Physical Exam  Vitals and nursing note reviewed.   Constitutional:       Appearance: She is well-developed.   HENT:      Head: Normocephalic and atraumatic.      Right Ear: Tympanic membrane, ear canal and external ear normal.      Left Ear: Tympanic membrane, ear canal and external ear normal.      Nose: Nose normal.      Mouth/Throat:      Pharynx: No oropharyngeal exudate.   Eyes: "      General: No scleral icterus.        Right eye: No discharge.         Left eye: No discharge.      Conjunctiva/sclera: Conjunctivae normal.      Pupils: Pupils are equal, round, and reactive to light.   Neck:      Thyroid: No thyromegaly.   Cardiovascular:      Rate and Rhythm: Normal rate and regular rhythm.      Heart sounds: No murmur heard.     No gallop.   Pulmonary:      Effort: Pulmonary effort is normal. No respiratory distress.      Breath sounds: Normal breath sounds. No wheezing or rales.   Abdominal:      Palpations: Abdomen is soft.      Tenderness: There is no abdominal tenderness.   Musculoskeletal:         General: Swelling and tenderness present. No deformity.      Cervical back: Normal range of motion.      Right lower leg: No edema.      Left lower leg: No edema.      Comments: Bilateral hands and wrist   Lymphadenopathy:      Cervical: No cervical adenopathy.   Skin:     General: Skin is warm.      Capillary Refill: Capillary refill takes less than 2 seconds.      Findings: Rash (intertrigo-candidal) present. No erythema.   Neurological:      Mental Status: She is alert and oriented to person, place, and time.      Deep Tendon Reflexes: Reflexes normal.   Psychiatric:         Behavior: Behavior normal.         Thought Content: Thought content normal.         Judgment: Judgment normal.          Flaquita Chilel MD  [facility] MEDICINE

## 2024-05-21 ENCOUNTER — TELEPHONE (OUTPATIENT)
Dept: PSYCHIATRY | Facility: CLINIC | Age: 54
End: 2024-05-21

## 2024-06-13 ENCOUNTER — HOSPITAL ENCOUNTER (OUTPATIENT)
Dept: RADIOLOGY | Age: 54
Discharge: HOME | End: 2024-06-13
Payer: COMMERCIAL

## 2024-06-13 VITALS — BODY MASS INDEX: 27.49 KG/M2 | WEIGHT: 165 LBS | HEIGHT: 65 IN

## 2024-06-13 DIAGNOSIS — Z12.31 ENCOUNTER FOR SCREENING MAMMOGRAM FOR BREAST CANCER: ICD-10-CM

## 2024-06-13 PROCEDURE — 77063 BREAST TOMOSYNTHESIS BI: CPT

## 2024-06-13 PROCEDURE — 77067 SCR MAMMO BI INCL CAD: CPT

## 2024-09-04 ENCOUNTER — HOSPITAL ENCOUNTER (OUTPATIENT)
Dept: NEUROLOGY | Facility: CLINIC | Age: 54
Discharge: HOME | End: 2024-09-04
Payer: COMMERCIAL

## 2024-09-04 DIAGNOSIS — R20.2 PARESTHESIA AND PAIN OF BOTH UPPER EXTREMITIES: ICD-10-CM

## 2024-09-04 DIAGNOSIS — M79.601 PARESTHESIA AND PAIN OF BOTH UPPER EXTREMITIES: ICD-10-CM

## 2024-09-04 DIAGNOSIS — M79.602 PARESTHESIA AND PAIN OF BOTH UPPER EXTREMITIES: ICD-10-CM

## 2024-09-04 PROBLEM — G56.03 BILATERAL CARPAL TUNNEL SYNDROME: Status: ACTIVE | Noted: 2024-09-04

## 2024-09-04 PROCEDURE — 95886 MUSC TEST DONE W/N TEST COMP: CPT | Performed by: PSYCHIATRY & NEUROLOGY

## 2024-09-04 PROCEDURE — 95912 NRV CNDJ TEST 11-12 STUDIES: CPT | Performed by: PSYCHIATRY & NEUROLOGY

## 2024-09-05 ENCOUNTER — HOSPITAL ENCOUNTER (OUTPATIENT)
Dept: RADIOLOGY | Facility: HOSPITAL | Age: 54
End: 2024-09-05
Payer: COMMERCIAL

## 2024-09-05 ENCOUNTER — APPOINTMENT (OUTPATIENT)
Dept: LAB | Facility: HOSPITAL | Age: 54
End: 2024-09-05
Attending: FAMILY MEDICINE
Payer: COMMERCIAL

## 2024-09-05 DIAGNOSIS — M79.601 PARESTHESIA AND PAIN OF BOTH UPPER EXTREMITIES: ICD-10-CM

## 2024-09-05 DIAGNOSIS — M25.50 POLYARTHRALGIA: ICD-10-CM

## 2024-09-05 DIAGNOSIS — M19.049 HAND ARTHRITIS: ICD-10-CM

## 2024-09-05 DIAGNOSIS — R20.2 PARESTHESIA AND PAIN OF BOTH UPPER EXTREMITIES: ICD-10-CM

## 2024-09-05 DIAGNOSIS — Z00.00 ANNUAL PHYSICAL EXAM: ICD-10-CM

## 2024-09-05 DIAGNOSIS — E55.9 VITAMIN D DEFICIENCY: ICD-10-CM

## 2024-09-05 DIAGNOSIS — M79.602 PARESTHESIA AND PAIN OF BOTH UPPER EXTREMITIES: ICD-10-CM

## 2024-09-05 DIAGNOSIS — G56.03 BILATERAL CARPAL TUNNEL SYNDROME: Primary | ICD-10-CM

## 2024-09-05 DIAGNOSIS — Z13.6 SCREENING FOR CARDIOVASCULAR CONDITION: ICD-10-CM

## 2024-09-05 DIAGNOSIS — Z13.1 SCREENING FOR DIABETES MELLITUS: ICD-10-CM

## 2024-09-05 LAB
25(OH)D3 SERPL-MCNC: 22.6 NG/ML (ref 30–100)
ALBUMIN SERPL BCG-MCNC: 4.3 G/DL (ref 3.5–5)
ALP SERPL-CCNC: 79 U/L (ref 34–104)
ALT SERPL W P-5'-P-CCNC: 15 U/L (ref 7–52)
ANA SER QL IA: POSITIVE
ANION GAP SERPL CALCULATED.3IONS-SCNC: 10 MMOL/L (ref 4–13)
AST SERPL W P-5'-P-CCNC: 20 U/L (ref 13–39)
BASOPHILS # BLD AUTO: 0.1 THOUSANDS/ÂΜL (ref 0–0.1)
BASOPHILS NFR BLD AUTO: 2 % (ref 0–1)
BILIRUB SERPL-MCNC: 0.42 MG/DL (ref 0.2–1)
BUN SERPL-MCNC: 17 MG/DL (ref 5–25)
CALCIUM SERPL-MCNC: 9.2 MG/DL (ref 8.4–10.2)
CHLORIDE SERPL-SCNC: 103 MMOL/L (ref 96–108)
CHOLEST SERPL-MCNC: 300 MG/DL
CO2 SERPL-SCNC: 26 MMOL/L (ref 21–32)
CREAT SERPL-MCNC: 0.98 MG/DL (ref 0.6–1.3)
CRP SERPL QL: 1.1 MG/L
EOSINOPHIL # BLD AUTO: 0.27 THOUSAND/ÂΜL (ref 0–0.61)
EOSINOPHIL NFR BLD AUTO: 5 % (ref 0–6)
ERYTHROCYTE [DISTWIDTH] IN BLOOD BY AUTOMATED COUNT: 12.3 % (ref 11.6–15.1)
ERYTHROCYTE [SEDIMENTATION RATE] IN BLOOD: 7 MM/HOUR (ref 0–29)
EST. AVERAGE GLUCOSE BLD GHB EST-MCNC: 120 MG/DL
FOLATE SERPL-MCNC: 9.5 NG/ML
GFR SERPL CREATININE-BSD FRML MDRD: 66 ML/MIN/1.73SQ M
GLUCOSE P FAST SERPL-MCNC: 94 MG/DL (ref 65–99)
HBA1C MFR BLD: 5.8 %
HCT VFR BLD AUTO: 42.7 % (ref 34.8–46.1)
HDLC SERPL-MCNC: 58 MG/DL
HGB BLD-MCNC: 14.2 G/DL (ref 11.5–15.4)
IMM GRANULOCYTES # BLD AUTO: 0.01 THOUSAND/UL (ref 0–0.2)
IMM GRANULOCYTES NFR BLD AUTO: 0 % (ref 0–2)
LDLC SERPL CALC-MCNC: 198 MG/DL (ref 0–100)
LYMPHOCYTES # BLD AUTO: 2.46 THOUSANDS/ÂΜL (ref 0.6–4.47)
LYMPHOCYTES NFR BLD AUTO: 47 % (ref 14–44)
MCH RBC QN AUTO: 31.1 PG (ref 26.8–34.3)
MCHC RBC AUTO-ENTMCNC: 33.3 G/DL (ref 31.4–37.4)
MCV RBC AUTO: 94 FL (ref 82–98)
MONOCYTES # BLD AUTO: 0.38 THOUSAND/ÂΜL (ref 0.17–1.22)
MONOCYTES NFR BLD AUTO: 7 % (ref 4–12)
NEUTROPHILS # BLD AUTO: 2.04 THOUSANDS/ÂΜL (ref 1.85–7.62)
NEUTS SEG NFR BLD AUTO: 39 % (ref 43–75)
NONHDLC SERPL-MCNC: 242 MG/DL
NRBC BLD AUTO-RTO: 0 /100 WBCS
PLATELET # BLD AUTO: 274 THOUSANDS/UL (ref 149–390)
PMV BLD AUTO: 9.6 FL (ref 8.9–12.7)
POTASSIUM SERPL-SCNC: 4.2 MMOL/L (ref 3.5–5.3)
PROT SERPL-MCNC: 7.2 G/DL (ref 6.4–8.4)
RBC # BLD AUTO: 4.56 MILLION/UL (ref 3.81–5.12)
RHEUMATOID FACT SER QL LA: NEGATIVE
SODIUM SERPL-SCNC: 139 MMOL/L (ref 135–147)
TRIGL SERPL-MCNC: 222 MG/DL
TSH SERPL DL<=0.05 MIU/L-ACNC: 3.63 UIU/ML (ref 0.45–4.5)
URATE SERPL-MCNC: 6.7 MG/DL (ref 2–7.5)
VIT B12 SERPL-MCNC: 194 PG/ML (ref 180–914)
WBC # BLD AUTO: 5.26 THOUSAND/UL (ref 4.31–10.16)

## 2024-09-05 PROCEDURE — 82746 ASSAY OF FOLIC ACID SERUM: CPT

## 2024-09-05 PROCEDURE — 86140 C-REACTIVE PROTEIN: CPT

## 2024-09-05 PROCEDURE — 86038 ANTINUCLEAR ANTIBODIES: CPT

## 2024-09-05 PROCEDURE — 84443 ASSAY THYROID STIM HORMONE: CPT

## 2024-09-05 PROCEDURE — 85025 COMPLETE CBC W/AUTO DIFF WBC: CPT

## 2024-09-05 PROCEDURE — 36415 COLL VENOUS BLD VENIPUNCTURE: CPT

## 2024-09-05 PROCEDURE — 73130 X-RAY EXAM OF HAND: CPT

## 2024-09-05 PROCEDURE — 86039 ANTINUCLEAR ANTIBODIES (ANA): CPT

## 2024-09-05 PROCEDURE — 85652 RBC SED RATE AUTOMATED: CPT

## 2024-09-05 PROCEDURE — 84425 ASSAY OF VITAMIN B-1: CPT

## 2024-09-05 PROCEDURE — 82306 VITAMIN D 25 HYDROXY: CPT

## 2024-09-05 PROCEDURE — 82607 VITAMIN B-12: CPT

## 2024-09-05 PROCEDURE — 80053 COMPREHEN METABOLIC PANEL: CPT

## 2024-09-05 PROCEDURE — 80061 LIPID PANEL: CPT

## 2024-09-05 PROCEDURE — 83036 HEMOGLOBIN GLYCOSYLATED A1C: CPT

## 2024-09-05 PROCEDURE — 86430 RHEUMATOID FACTOR TEST QUAL: CPT

## 2024-09-05 PROCEDURE — 84550 ASSAY OF BLOOD/URIC ACID: CPT

## 2024-09-06 LAB
ANA HOMOGEN SER QL IF: NORMAL
ANA HOMOGEN TITR SER: NORMAL {TITER}

## 2024-09-11 LAB — VIT B1 BLD-SCNC: 130.8 NMOL/L (ref 66.5–200)

## 2024-09-17 ENCOUNTER — APPOINTMENT (OUTPATIENT)
Dept: LAB | Facility: AMBULARY SURGERY CENTER | Age: 54
End: 2024-09-17
Attending: STUDENT IN AN ORGANIZED HEALTH CARE EDUCATION/TRAINING PROGRAM
Payer: COMMERCIAL

## 2024-09-17 ENCOUNTER — OFFICE VISIT (OUTPATIENT)
Dept: OBGYN CLINIC | Facility: CLINIC | Age: 54
End: 2024-09-17
Payer: COMMERCIAL

## 2024-09-17 VITALS — WEIGHT: 166.4 LBS | BODY MASS INDEX: 27.72 KG/M2 | HEIGHT: 65 IN

## 2024-09-17 DIAGNOSIS — G56.02 CARPAL TUNNEL SYNDROME ON LEFT: ICD-10-CM

## 2024-09-17 DIAGNOSIS — G56.01 CARPAL TUNNEL SYNDROME ON RIGHT: Primary | ICD-10-CM

## 2024-09-17 DIAGNOSIS — G56.01 CARPAL TUNNEL SYNDROME ON RIGHT: ICD-10-CM

## 2024-09-17 PROCEDURE — 99204 OFFICE O/P NEW MOD 45 MIN: CPT | Performed by: STUDENT IN AN ORGANIZED HEALTH CARE EDUCATION/TRAINING PROGRAM

## 2024-09-17 RX ORDER — CHLORHEXIDINE GLUCONATE ORAL RINSE 1.2 MG/ML
15 SOLUTION DENTAL ONCE
OUTPATIENT
Start: 2024-09-17 | End: 2024-09-17

## 2024-09-17 RX ORDER — CHLORHEXIDINE GLUCONATE ORAL RINSE 1.2 MG/ML
15 SOLUTION DENTAL ONCE
Status: CANCELLED | OUTPATIENT
Start: 2024-09-17 | End: 2024-09-17

## 2024-09-17 NOTE — H&P (VIEW-ONLY)
ORTHOPAEDIC HAND, WRIST, AND ELBOW OFFICE  VISIT      ASSESSMENT/PLAN:      Diagnoses and all orders for this visit:    Carpal tunnel syndrome on right  -     Ambulatory Referral to Orthopedic Surgery  -     Case request operating room: RELEASE CARPAL TUNNEL ENDOSCOPIC; Standing  -     EKG 12 lead; Future  -     Case request operating room: RELEASE CARPAL TUNNEL ENDOSCOPIC    Carpal tunnel syndrome on left    Other orders  -     Diet NPO; Sips with meds; Standing  -     Nursing Communication Warmimg Interventions Implemented; Standing  -     Nursing Communication CHG bath, have staff wash entire body (neck down) per pre-op bathing protocol. Routine, evening prior to, and day of surgery.; Standing  -     Nursing Communication Swab both nares with Povidone-Iodine solution, EXCLUDE if patient has shellfish/Iodine allergy. Routine, day of surgery, on call to OR; Standing  -     chlorhexidine (PERIDEX) 0.12 % oral rinse 15 mL  -     Void on call to OR; Standing  -     Insert peripheral IV; Standing  -     ceFAZolin (ANCEF) 2,000 mg in sodium chloride 0.9 % 50 mL IVPB          53 y.o. female with bilateral carpal tunnel syndrome    The EMG was reviewed in the office today which demonstrates bilateral carpal tunnel syndrome moderate on the right and mild on the left.   Non operative versus surgical intervention was discussed.   Treatment options and expected outcomes were discussed.  The patient verbalized understanding of exam findings and treatment plan.   The patient was given the opportunity to ask questions.  Questions were answered to the patient's satisfaction.  The patient decided to move forward with surgical intervention.  Surgical intervention was discussed in the form of endoscopic versos open right carpal tunnel release.  Risks of the surgery are inclusive of but not limited to bleeding, infection, nerve injury, blood clot, worsening of symptoms, not achieving the anticipated results, persistent stiffness,  weakness and the need for additional surgery. The patient verbally stated they understood those risks and would like to proceed with the surgery.  Discussed her symptoms may not fully resolve postoperatively as surgery takes pressure off the nerve and does not fix the nerve.  Also discussed possibility of pillar pain postoperatively.  We will proceed with left carpal tunnel release a few weeks postoperatively.  Surgical consent was signed in the office today.  I will see the patient the day of surgery.      Follow Up:  After surgery       To Do Next Visit:  Sutures out      Discussions:  Endoscopic Carpal Tunnel Release:   The anatomy and physiology of carpal tunnel syndrome was discussed with the patient today.  Increase pressure localized under the transverse carpal ligament can cause pain, numbness, tingling, or dysesthesias within the median nerve distribution as well as feelings of fatigue, clumsiness, or awkwardness.  These symptoms typically occur at night and worse in the morning upon waking.  Eventually, untreated carpal tunnel syndrome can result in weakness and permanent loss of muscle within the thenar compartment of the hand.  Treatment options were discussed with the patient.  Conservative treatment includes nocturnal resting splints to keep the nerve in a neutral position, ergonomic changes within the work or home environment, activity modification, and tendon gliding exercises.  Vitamin B6 one tablet daily over the counter may helpful to reduce symptoms.  Steroid injections within the carpal canal can help a majority of patients, however this is often self-limited in a majority of patients.  Surgical intervention to divide the transverse carpal ligament typically results in a long-lasting relief of the patient's complaints, with the recurrence rate of less than 1%. The patient has elected to undergo an endoscopic carpal tunnel release.  The single incision technique was discussed with the patient,  which results in approximately a two-week recovery time less wound complications.  In the postoperative period, light activities are allowed immediately, driving is allowed when narcotic medication has stopped, and the bandages may be removed and incision may get wet after 2 days.  Heavy activities (lifting more than approximately 10 pounds) will be allowed after follow up appointment in 1-2 weeks.  While night symptoms (waking from sleep, pain, and discomfort in the hands) generally improves rapidly, the numbness and tingling as well as the strength will slowly improve over weeks to months depending on the chronicity and severity of the carpal tunnel syndrome.  Pillar pain and scar discomfort were discussed with the patient which are self-limiting conditions.  The risks of bleeding and infection from the surgery are less than 1%.  Risk of recurrence is approximately 0.5%.  The risks of nerve injury or nerve damage or damage to the blood vessels is approximately 1 in 1200. The patient has an understanding of the above mentioned discussion.The risks and benefits of the procedure were explained to the patient, which include, but are not limited to: Bleeding, infection, recurrence, pain, scar, damage to tendons, damage to nerves, and damage to blood vessels, failure to give desired results and complications related to anesthesia.  These risks, along with alternative conservative treatment options, and postoperative protocols were voiced back and understood by the patient.  All questions were answered to the patient's satisfaction.  The patient agrees to comply with a standard postoperative protocol, and is willing to proceed.  Education was provided via written and auditory forms.  There were no barriers to learning. Written handouts regarding wound care, incision and scar care, and general preoperative information was provided to the patient.  Prior to surgery, the patient may be requested to stop all  anti-inflammatory medications.  Prophylactic aspirin, Plavix, and Coumadin may be allowed to be continued.  Medications including vitamin E., ginkgo, & fish oil are requested to be stopped about one week.      Gato Hadley MD  Attending, Orthopaedic Surgery  Hand, Wrist, and Elbow Surgery  [facility]    ______________________________________________________________________________________________    CHIEF COMPLAINT:  Chief Complaint   Patient presents with    Left Hand - Pain, Numbness     Patient has sharp pain in both hands , numbness and tingling she has started wearing braces at night which has started to help.    Right Hand - Numbness, Pain       SUBJECTIVE:  Patient is a 53 y.o. RHD female who presents today for evaluation and treatment of bilateral hand numbness and tingling. The patient states this has been ongoing for years. She states her right is constant and her left is intermittent. She notes numbness to the entire hand bilaterally and states this will wake her from sleep. The patient underwent an EMG which we have to review. She has been night time bracing.    Referred for evaluation by Flaquita Chilel MD.     Occupation: catering business      I have personally reviewed all the relevant PMH, PSH, SH, FH, Medications and allergies      PAST MEDICAL HISTORY:  Past Medical History:   Diagnosis Date    Allergic     Seasonal    Anxiety     Cancer (HCC)     Skin cancer removed from forehead February 2020    Genital warts 1992    GERD (gastroesophageal reflux disease)     Hydroureteronephrosis     Skin cancer     removed 2 2020 from face    Varicella     as a baby       PAST SURGICAL HISTORY:  Past Surgical History:   Procedure Laterality Date    HYSTERECTOMY  04/01/2015    still has ovaries    IN CSTC PRTL W/RODOLFO URTR IN BLDR URTRONEOCSTOST Left 5/24/2017    Procedure: OPEN URETERAL RE-IMPLANT ;  Surgeon: Darryl Azevedo MD;  Location: [facility];  Service: Urology    IN CYSTO  BLADDER W/URETERAL CATHETERIZATION Left 2017    Procedure: CYSTOSCOPY RETROGRADE PYELOGRAM WITH INSERTION STENT URETERAL;  Surgeon: Darryl Azevedo MD;  Location: [facility];  Service: Urology    MT CYSTO W/URTROSCOPY&/PYELOSCOPY DX Left 2017    Procedure: URETEROSCOPY;  Surgeon: Darryl Azevedo MD;  Location: [facility];  Service: Urology       FAMILY HISTORY:  Family History   Problem Relation Age of Onset    Multiple myeloma Mother     Cancer Mother         Multiple Myeloma    Depression Mother     Stroke Sister     Depression Sister     Multiple myeloma Maternal Grandfather     Stroke Paternal Grandfather     Pancreatic cancer Maternal Uncle        SOCIAL HISTORY:  Social History     Tobacco Use    Smoking status: Former     Current packs/day: 0.00     Average packs/day: 0.5 packs/day for 20.0 years (10.0 ttl pk-yrs)     Types: Cigarettes     Start date: 1993     Quit date: 2013     Years since quittin.7    Smokeless tobacco: Never    Tobacco comments:     stopped 5 years   Vaping Use    Vaping status: Never Used   Substance Use Topics    Alcohol use: Yes     Alcohol/week: 6.0 standard drinks of alcohol     Types: 6 Glasses of wine per week     Comment: social    Drug use: Not Currently     Types: Marijuana       MEDICATIONS:    Current Outpatient Medications:     clotrimazole (LOTRIMIN) 1 % cream, Apply topically 2 (two) times a day, Disp: 45 g, Rfl: 0    magnesium Oxide (MAG-OX) 400 mg TABS, Take 1 tablet (400 mg total) by mouth daily, Disp: 30 tablet, Rfl: 1    Multiple Vitamin (Multivitamin Adult) TABS, Take 1 capsule by mouth daily, Disp: , Rfl:     nystatin (MYCOSTATIN) powder, Apply topically 3 (three) times a day, Disp: 60 g, Rfl: 2    ibuprofen (MOTRIN) 800 mg tablet, Take 1 tablet (800 mg total) by mouth every 8 (eight) hours as needed for mild pain or moderate pain (Patient not taking: Reported on 2024), Disp: 30 tablet, Rfl: 0    ALLERGIES:  Allergies   Allergen Reactions     Penicillins Swelling and Hives     And Hives    Seasonal Ic [Cholestatin] Nasal Congestion           REVIEW OF SYSTEMS:  Musculoskeletal:        As noted in HPI.   All other systems reviewed and are negative.    VITALS:  There were no vitals filed for this visit.    LABS:  HgA1c:   Lab Results   Component Value Date    HGBA1C 5.8 (H) 09/05/2024     BMP:   Lab Results   Component Value Date    GLUCOSE 104 04/06/2015    CALCIUM 9.2 09/05/2024     04/06/2015    K 4.2 09/05/2024    CO2 26 09/05/2024     09/05/2024    BUN 17 09/05/2024    CREATININE 0.98 09/05/2024       _____________________________________________________  PHYSICAL EXAMINATION:  General: Well developed and well nourished, alert & oriented x 3, appears comfortable  Psychiatric: Normal  HEENT: Normocephalic, Atraumatic Trachea Midline, No torticollis  Pulmonary: No audible wheezing or respiratory distress   Abdomen/GI: Non tender, non distended   Cardiovascular: No pitting edema, 2+ radial pulse   Skin: No masses, erythema, lacerations, fluctation, ulcerations  Neurovascular: Sensation Intact to the Median, Ulnar, Radial Nerve, Motor Intact to the Median, Ulnar, Radial Nerve, and Pulses Intact  Musculoskeletal: Normal, except as noted in detailed exam and in HPI.      MUSCULOSKELETAL EXAMINATION:  Right hand  + tinel's at the wrist  Moderate thenar atrophy  4/5 APB  - tinel's at the elbow  No ulnar nerve subluxation    Left hand  - tinel's at the wrist  Mild thenar atrophy  4/5 APB  - tinel's at the elbow  No ulnar nerve subluxation       Right radial (mm) Right ulnar (mm) Left radial (mm) Left ulnar (mm)   Thumb 5 5 5 5   Index 5 5 5 5   Long 5 5 5 5   Ring 5 5 5 5   Small 5 5 5 5       ___________________________________________________  STUDIES REVIEWED:  EMG was personally reviewed and independently interpreted by Dr. Hadley and demonstrates moderate right carpal tunnel syndrome and mild left carpal tunnel syndrome.            PROCEDURES PERFORMED:  Procedures  No Procedures performed today    _____________________________________________________      Scribe Attestation      I,:  Vandana Pathak MA am acting as a scribe while in the presence of the attending physician.:       I,:  Gato Hadley MD personally performed the services described in this documentation    as scribed in my presence.:

## 2024-09-17 NOTE — PROGRESS NOTES
ORTHOPAEDIC HAND, WRIST, AND ELBOW OFFICE  VISIT      ASSESSMENT/PLAN:      Diagnoses and all orders for this visit:    Carpal tunnel syndrome on right  -     Ambulatory Referral to Orthopedic Surgery  -     Case request operating room: RELEASE CARPAL TUNNEL ENDOSCOPIC; Standing  -     EKG 12 lead; Future  -     Case request operating room: RELEASE CARPAL TUNNEL ENDOSCOPIC    Carpal tunnel syndrome on left    Other orders  -     Diet NPO; Sips with meds; Standing  -     Nursing Communication Warmimg Interventions Implemented; Standing  -     Nursing Communication CHG bath, have staff wash entire body (neck down) per pre-op bathing protocol. Routine, evening prior to, and day of surgery.; Standing  -     Nursing Communication Swab both nares with Povidone-Iodine solution, EXCLUDE if patient has shellfish/Iodine allergy. Routine, day of surgery, on call to OR; Standing  -     chlorhexidine (PERIDEX) 0.12 % oral rinse 15 mL  -     Void on call to OR; Standing  -     Insert peripheral IV; Standing  -     ceFAZolin (ANCEF) 2,000 mg in sodium chloride 0.9 % 50 mL IVPB          53 y.o. female with bilateral carpal tunnel syndrome    The EMG was reviewed in the office today which demonstrates bilateral carpal tunnel syndrome moderate on the right and mild on the left.   Non operative versus surgical intervention was discussed.   Treatment options and expected outcomes were discussed.  The patient verbalized understanding of exam findings and treatment plan.   The patient was given the opportunity to ask questions.  Questions were answered to the patient's satisfaction.  The patient decided to move forward with surgical intervention.  Surgical intervention was discussed in the form of endoscopic versos open right carpal tunnel release.  Risks of the surgery are inclusive of but not limited to bleeding, infection, nerve injury, blood clot, worsening of symptoms, not achieving the anticipated results, persistent stiffness,  weakness and the need for additional surgery. The patient verbally stated they understood those risks and would like to proceed with the surgery.  Discussed her symptoms may not fully resolve postoperatively as surgery takes pressure off the nerve and does not fix the nerve.  Also discussed possibility of pillar pain postoperatively.  We will proceed with left carpal tunnel release a few weeks postoperatively.  Surgical consent was signed in the office today.  I will see the patient the day of surgery.      Follow Up:  After surgery       To Do Next Visit:  Sutures out      Discussions:  Endoscopic Carpal Tunnel Release:   The anatomy and physiology of carpal tunnel syndrome was discussed with the patient today.  Increase pressure localized under the transverse carpal ligament can cause pain, numbness, tingling, or dysesthesias within the median nerve distribution as well as feelings of fatigue, clumsiness, or awkwardness.  These symptoms typically occur at night and worse in the morning upon waking.  Eventually, untreated carpal tunnel syndrome can result in weakness and permanent loss of muscle within the thenar compartment of the hand.  Treatment options were discussed with the patient.  Conservative treatment includes nocturnal resting splints to keep the nerve in a neutral position, ergonomic changes within the work or home environment, activity modification, and tendon gliding exercises.  Vitamin B6 one tablet daily over the counter may helpful to reduce symptoms.  Steroid injections within the carpal canal can help a majority of patients, however this is often self-limited in a majority of patients.  Surgical intervention to divide the transverse carpal ligament typically results in a long-lasting relief of the patient's complaints, with the recurrence rate of less than 1%. The patient has elected to undergo an endoscopic carpal tunnel release.  The single incision technique was discussed with the patient,  which results in approximately a two-week recovery time less wound complications.  In the postoperative period, light activities are allowed immediately, driving is allowed when narcotic medication has stopped, and the bandages may be removed and incision may get wet after 2 days.  Heavy activities (lifting more than approximately 10 pounds) will be allowed after follow up appointment in 1-2 weeks.  While night symptoms (waking from sleep, pain, and discomfort in the hands) generally improves rapidly, the numbness and tingling as well as the strength will slowly improve over weeks to months depending on the chronicity and severity of the carpal tunnel syndrome.  Pillar pain and scar discomfort were discussed with the patient which are self-limiting conditions.  The risks of bleeding and infection from the surgery are less than 1%.  Risk of recurrence is approximately 0.5%.  The risks of nerve injury or nerve damage or damage to the blood vessels is approximately 1 in 1200. The patient has an understanding of the above mentioned discussion.The risks and benefits of the procedure were explained to the patient, which include, but are not limited to: Bleeding, infection, recurrence, pain, scar, damage to tendons, damage to nerves, and damage to blood vessels, failure to give desired results and complications related to anesthesia.  These risks, along with alternative conservative treatment options, and postoperative protocols were voiced back and understood by the patient.  All questions were answered to the patient's satisfaction.  The patient agrees to comply with a standard postoperative protocol, and is willing to proceed.  Education was provided via written and auditory forms.  There were no barriers to learning. Written handouts regarding wound care, incision and scar care, and general preoperative information was provided to the patient.  Prior to surgery, the patient may be requested to stop all  anti-inflammatory medications.  Prophylactic aspirin, Plavix, and Coumadin may be allowed to be continued.  Medications including vitamin E., ginkgo, & fish oil are requested to be stopped about one week.      Gato Hadley MD  Attending, Orthopaedic Surgery  Hand, Wrist, and Elbow Surgery  [facility]    ______________________________________________________________________________________________    CHIEF COMPLAINT:  Chief Complaint   Patient presents with    Left Hand - Pain, Numbness     Patient has sharp pain in both hands , numbness and tingling she has started wearing braces at night which has started to help.    Right Hand - Numbness, Pain       SUBJECTIVE:  Patient is a 53 y.o. RHD female who presents today for evaluation and treatment of bilateral hand numbness and tingling. The patient states this has been ongoing for years. She states her right is constant and her left is intermittent. She notes numbness to the entire hand bilaterally and states this will wake her from sleep. The patient underwent an EMG which we have to review. She has been night time bracing.    Referred for evaluation by Flaquita Chilel MD.     Occupation: catering business      I have personally reviewed all the relevant PMH, PSH, SH, FH, Medications and allergies      PAST MEDICAL HISTORY:  Past Medical History:   Diagnosis Date    Allergic     Seasonal    Anxiety     Cancer (HCC)     Skin cancer removed from forehead February 2020    Genital warts 1992    GERD (gastroesophageal reflux disease)     Hydroureteronephrosis     Skin cancer     removed 2 2020 from face    Varicella     as a baby       PAST SURGICAL HISTORY:  Past Surgical History:   Procedure Laterality Date    HYSTERECTOMY  04/01/2015    still has ovaries    NC CSTC PRTL W/RODOLFO URTR IN BLDR URTRONEOCSTOST Left 5/24/2017    Procedure: OPEN URETERAL RE-IMPLANT ;  Surgeon: Darryl Azevedo MD;  [facility];  Service: Urology    NC CYSTO  BLADDER W/URETERAL CATHETERIZATION Left 2017    Procedure: CYSTOSCOPY RETROGRADE PYELOGRAM WITH INSERTION STENT URETERAL;  Surgeon: Darryl Azevedo MD;  [facility];  Service: Urology    ND CYSTO W/URTROSCOPY&/PYELOSCOPY DX Left 2017    Procedure: URETEROSCOPY;  Surgeon: Darryl Azevedo MD;  [facility];  Service: Urology       FAMILY HISTORY:  Family History   Problem Relation Age of Onset    Multiple myeloma Mother     Cancer Mother         Multiple Myeloma    Depression Mother     Stroke Sister     Depression Sister     Multiple myeloma Maternal Grandfather     Stroke Paternal Grandfather     Pancreatic cancer Maternal Uncle        SOCIAL HISTORY:  Social History     Tobacco Use    Smoking status: Former     Current packs/day: 0.00     Average packs/day: 0.5 packs/day for 20.0 years (10.0 ttl pk-yrs)     Types: Cigarettes     Start date: 1993     Quit date: 2013     Years since quittin.7    Smokeless tobacco: Never    Tobacco comments:     stopped 5 years   Vaping Use    Vaping status: Never Used   Substance Use Topics    Alcohol use: Yes     Alcohol/week: 6.0 standard drinks of alcohol     Types: 6 Glasses of wine per week     Comment: social    Drug use: Not Currently     Types: Marijuana       MEDICATIONS:    Current Outpatient Medications:     clotrimazole (LOTRIMIN) 1 % cream, Apply topically 2 (two) times a day, Disp: 45 g, Rfl: 0    magnesium Oxide (MAG-OX) 400 mg TABS, Take 1 tablet (400 mg total) by mouth daily, Disp: 30 tablet, Rfl: 1    Multiple Vitamin (Multivitamin Adult) TABS, Take 1 capsule by mouth daily, Disp: , Rfl:     nystatin (MYCOSTATIN) powder, Apply topically 3 (three) times a day, Disp: 60 g, Rfl: 2    ibuprofen (MOTRIN) 800 mg tablet, Take 1 tablet (800 mg total) by mouth every 8 (eight) hours as needed for mild pain or moderate pain (Patient not taking: Reported on 2024), Disp: 30 tablet, Rfl: 0    ALLERGIES:  Allergies   Allergen Reactions     Penicillins Swelling and Hives     And Hives    Seasonal Ic [Cholestatin] Nasal Congestion           REVIEW OF SYSTEMS:  Musculoskeletal:        As noted in HPI.   All other systems reviewed and are negative.    VITALS:  There were no vitals filed for this visit.    LABS:  HgA1c:   Lab Results   Component Value Date    HGBA1C 5.8 (H) 09/05/2024     BMP:   Lab Results   Component Value Date    GLUCOSE 104 04/06/2015    CALCIUM 9.2 09/05/2024     04/06/2015    K 4.2 09/05/2024    CO2 26 09/05/2024     09/05/2024    BUN 17 09/05/2024    CREATININE 0.98 09/05/2024       _____________________________________________________  PHYSICAL EXAMINATION:  General: Well developed and well nourished, alert & oriented x 3, appears comfortable  Psychiatric: Normal  HEENT: Normocephalic, Atraumatic Trachea Midline, No torticollis  Pulmonary: No audible wheezing or respiratory distress   Abdomen/GI: Non tender, non distended   Cardiovascular: No pitting edema, 2+ radial pulse   Skin: No masses, erythema, lacerations, fluctation, ulcerations  Neurovascular: Sensation Intact to the Median, Ulnar, Radial Nerve, Motor Intact to the Median, Ulnar, Radial Nerve, and Pulses Intact  Musculoskeletal: Normal, except as noted in detailed exam and in HPI.      MUSCULOSKELETAL EXAMINATION:  Right hand  + tinel's at the wrist  Moderate thenar atrophy  4/5 APB  - tinel's at the elbow  No ulnar nerve subluxation    Left hand  - tinel's at the wrist  Mild thenar atrophy  4/5 APB  - tinel's at the elbow  No ulnar nerve subluxation       Right radial (mm) Right ulnar (mm) Left radial (mm) Left ulnar (mm)   Thumb 5 5 5 5   Index 5 5 5 5   Long 5 5 5 5   Ring 5 5 5 5   Small 5 5 5 5       ___________________________________________________  STUDIES REVIEWED:  EMG was personally reviewed and independently interpreted by Dr. Hadley and demonstrates moderate right carpal tunnel syndrome and mild left carpal tunnel syndrome.            PROCEDURES PERFORMED:  Procedures  No Procedures performed today    _____________________________________________________      Scribe Attestation      I,:  Vandana Pathak MA am acting as a scribe while in the presence of the attending physician.:       I,:  Gato Hadley MD personally performed the services described in this documentation    as scribed in my presence.:

## 2024-09-18 LAB
ATRIAL RATE: 54 BPM
P AXIS: 37 DEGREES
PR INTERVAL: 202 MS
QRS AXIS: 6 DEGREES
QRSD INTERVAL: 80 MS
QT INTERVAL: 446 MS
QTC INTERVAL: 422 MS
T WAVE AXIS: 31 DEGREES
VENTRICULAR RATE: 54 BPM

## 2024-09-18 PROCEDURE — 93010 ELECTROCARDIOGRAM REPORT: CPT | Performed by: INTERNAL MEDICINE

## 2024-09-19 ENCOUNTER — ANESTHESIA EVENT (OUTPATIENT)
Dept: PERIOP | Facility: AMBULARY SURGERY CENTER | Age: 54
End: 2024-09-19
Payer: COMMERCIAL

## 2024-09-23 NOTE — PRE-PROCEDURE INSTRUCTIONS
Pre-Surgery Instructions:   Medication Instructions    clotrimazole (LOTRIMIN) 1 % cream Hold day of surgery.    ibuprofen (MOTRIN) 800 mg tablet Stop taking 7 days prior to surgery.    magnesium Oxide (MAG-OX) 400 mg TABS Stop taking 7 days prior to surgery.    Multiple Vitamin (Multivitamin Adult) TABS Stop taking 7 days prior to surgery.    nystatin (MYCOSTATIN) powder Hold day of surgery.    Medication instructions for day surgery reviewed. Please use only a sip of water to take your instructed medications. Avoid all over the counter vitamins, supplements and NSAIDS for one week prior to surgery per anesthesia guidelines. Tylenol is ok to take as needed.     You will receive a call one business day prior to surgery with an arrival time and hospital directions. If your surgery is scheduled on a Monday, the hospital will be calling you on the Friday prior to your surgery. If you have not heard from anyone by 8pm, please call the hospital supervisor through the hospital  at [phone]. ([place] [phone] or [place] [phone]).    Do not eat or drink anything after midnight the night before your surgery, including candy, mints, lifesavers, or chewing gum. Do not drink alcohol 24hrs before your surgery. Try not to smoke at least 24hrs before your surgery.       Follow the pre surgery showering instructions as listed in the “My Surgical Experience Booklet” or otherwise provided by your surgeon's office. Do not use a blade to shave the surgical area 1 week before surgery. It is okay to use a clean electric clippers up to 24 hours before surgery. Do not apply any lotions, creams, including makeup, cologne, deodorant, or perfumes after showering on the day of your surgery. Do not use dry shampoo, hair spray, hair gel, or any type of hair products.     No contact lenses, eye make-up, or artificial eyelashes. Remove nail polish, including gel polish, and any artificial, gel, or acrylic nails if possible.  Remove all jewelry including rings and body piercing jewelry.     Wear causal clothing that is easy to take on and off. Consider your type of surgery.    Keep any valuables, jewelry, piercings at home. Please bring any specially ordered equipment (sling, braces) if indicated.    Arrange for a responsible person to drive you to and from the hospital on the day of your surgery. Please confirm the visitor policy for the day of your procedure when you receive your phone call with an arrival time.     Call the surgeon's office with any new illnesses, exposures, or additional questions prior to surgery.    Please reference your “My Surgical Experience Booklet” for additional information to prepare for your upcoming surgery.    Pt verbalized understanding of shower and med instructions. Pt instructed to stop nsaids and supplements prior to surgery. Pt is also having surgery on 10/10/24.

## 2024-09-24 NOTE — ANESTHESIA PREPROCEDURE EVALUATION
Procedure:  RELEASE CARPAL TUNNEL ENDOSCOPIC (Right: Wrist)    Relevant Problems   /RENAL   (+) Hydroureteronephrosis   (+) UPJ obstruction, acquired (2017 REimplantation)      NEURO/PSYCH   (+) Numbness and tingling in both hands        Physical Exam    Airway    Mallampati score: II  TM Distance: >3 FB  Neck ROM: full     Dental   No notable dental hx     Cardiovascular      Pulmonary      Other Findings  post-pubertal.      Anesthesia Plan  ASA Score- 2     Anesthesia Type- IV sedation with anesthesia with ASA Monitors.         Additional Monitors:     Airway Plan:            Plan Factors-Exercise tolerance (METS): >4 METS.    Chart reviewed. EKG reviewed.  Existing labs reviewed. Patient summary reviewed.    Patient is not a current smoker.              Induction- intravenous.    Postoperative Plan- Plan for postoperative opioid use.         Informed Consent- Anesthetic plan and risks discussed with patient and spouse.  I personally reviewed this patient with the CRNA. Discussed and agreed on the Anesthesia Plan with the CRNA..

## 2024-09-24 NOTE — PRE-PROCEDURE INSTRUCTIONS
Pre-Surgery Instructions:   Medication Instructions    clotrimazole (LOTRIMIN) 1 % cream Hold day of surgery.    ibuprofen (MOTRIN) 800 mg tablet Stop taking 7 days prior to surgery.    magnesium Oxide (MAG-OX) 400 mg TABS Stop taking 7 days prior to surgery.    Multiple Vitamin (Multivitamin Adult) TABS Stop taking 7 days prior to surgery.    nystatin (MYCOSTATIN) powder Hold day of surgery.    Medication instructions for day surgery reviewed. Please use only a sip of water to take your instructed medications. Avoid all over the counter vitamins, supplements and NSAIDS for one week prior to surgery per anesthesia guidelines. Tylenol is ok to take as needed.     You will receive a call one business day prior to surgery with an arrival time and hospital directions. If your surgery is scheduled on a Monday, the hospital will be calling you on the Friday prior to your surgery. If you have not heard from anyone by 8pm, please call the hospital supervisor through the hospital  at [phone]. ([place] [phone] or [place] [phone]).    Do not eat or drink anything after midnight the night before your surgery, including candy, mints, lifesavers, or chewing gum. Do not drink alcohol 24hrs before your surgery. Try not to smoke at least 24hrs before your surgery.       Follow the pre surgery showering instructions as listed in the “My Surgical Experience Booklet” or otherwise provided by your surgeon's office. Do not use a blade to shave the surgical area 1 week before surgery. It is okay to use a clean electric clippers up to 24 hours before surgery. Do not apply any lotions, creams, including makeup, cologne, deodorant, or perfumes after showering on the day of your surgery. Do not use dry shampoo, hair spray, hair gel, or any type of hair products.     No contact lenses, eye make-up, or artificial eyelashes. Remove nail polish, including gel polish, and any artificial, gel, or acrylic nails if possible.  Remove all jewelry including rings and body piercing jewelry.     Wear causal clothing that is easy to take on and off. Consider your type of surgery.    Keep any valuables, jewelry, piercings at home. Please bring any specially ordered equipment (sling, braces) if indicated.    Arrange for a responsible person to drive you to and from the hospital on the day of your surgery. Please confirm the visitor policy for the day of your procedure when you receive your phone call with an arrival time.     Call the surgeon's office with any new illnesses, exposures, or additional questions prior to surgery.    Please reference your “My Surgical Experience Booklet” for additional information to prepare for your upcoming surgery.    Pt having carpal tunnel surgery on 9/24 and 10/10.  Pt verbalized understanding of shower and med instructions. Pt instructed to stop nsaids and supplements one week prior to surgery.

## 2024-09-25 ENCOUNTER — HOSPITAL ENCOUNTER (OUTPATIENT)
Facility: AMBULARY SURGERY CENTER | Age: 54
Setting detail: OUTPATIENT SURGERY
Discharge: HOME | End: 2024-09-25
Attending: STUDENT IN AN ORGANIZED HEALTH CARE EDUCATION/TRAINING PROGRAM | Admitting: STUDENT IN AN ORGANIZED HEALTH CARE EDUCATION/TRAINING PROGRAM
Payer: COMMERCIAL

## 2024-09-25 ENCOUNTER — ANESTHESIA (OUTPATIENT)
Dept: PERIOP | Facility: AMBULARY SURGERY CENTER | Age: 54
End: 2024-09-25
Payer: COMMERCIAL

## 2024-09-25 VITALS
SYSTOLIC BLOOD PRESSURE: 113 MMHG | TEMPERATURE: 97.6 F | DIASTOLIC BLOOD PRESSURE: 69 MMHG | WEIGHT: 166 LBS | RESPIRATION RATE: 16 BRPM | BODY MASS INDEX: 27.66 KG/M2 | OXYGEN SATURATION: 99 % | HEIGHT: 65 IN | HEART RATE: 80 BPM

## 2024-09-25 DIAGNOSIS — G56.03 BILATERAL CARPAL TUNNEL SYNDROME: Primary | ICD-10-CM

## 2024-09-25 PROCEDURE — 29848 WRIST ENDOSCOPY/SURGERY: CPT | Performed by: PHYSICIAN ASSISTANT

## 2024-09-25 PROCEDURE — 29848 WRIST ENDOSCOPY/SURGERY: CPT | Performed by: STUDENT IN AN ORGANIZED HEALTH CARE EDUCATION/TRAINING PROGRAM

## 2024-09-25 RX ORDER — KETOROLAC TROMETHAMINE 30 MG/ML
INJECTION, SOLUTION INTRAMUSCULAR; INTRAVENOUS AS NEEDED
Status: DISCONTINUED | OUTPATIENT
Start: 2024-09-25 | End: 2024-09-25

## 2024-09-25 RX ORDER — HYDROCODONE BITARTRATE AND ACETAMINOPHEN 5; 325 MG/1; MG/1
1 TABLET ORAL ONCE AS NEEDED
Status: COMPLETED | OUTPATIENT
Start: 2024-09-25 | End: 2024-09-25

## 2024-09-25 RX ORDER — PROPOFOL 10 MG/ML
INJECTION, EMULSION INTRAVENOUS AS NEEDED
Status: DISCONTINUED | OUTPATIENT
Start: 2024-09-25 | End: 2024-09-25

## 2024-09-25 RX ORDER — MIDAZOLAM HYDROCHLORIDE 2 MG/2ML
INJECTION, SOLUTION INTRAMUSCULAR; INTRAVENOUS AS NEEDED
Status: DISCONTINUED | OUTPATIENT
Start: 2024-09-25 | End: 2024-09-25

## 2024-09-25 RX ORDER — CEFAZOLIN SODIUM 2 G/50ML
2000 SOLUTION INTRAVENOUS ONCE
Status: DISCONTINUED | OUTPATIENT
Start: 2024-09-25 | End: 2024-09-25 | Stop reason: HOSPADM

## 2024-09-25 RX ORDER — SODIUM CHLORIDE, SODIUM LACTATE, POTASSIUM CHLORIDE, CALCIUM CHLORIDE 600; 310; 30; 20 MG/100ML; MG/100ML; MG/100ML; MG/100ML
125 INJECTION, SOLUTION INTRAVENOUS CONTINUOUS
Status: DISCONTINUED | OUTPATIENT
Start: 2024-09-25 | End: 2024-09-25 | Stop reason: HOSPADM

## 2024-09-25 RX ORDER — HYDROCODONE BITARTRATE AND ACETAMINOPHEN 5; 325 MG/1; MG/1
1 TABLET ORAL EVERY 6 HOURS PRN
Qty: 5 TABLET | Refills: 0 | Status: SHIPPED | OUTPATIENT
Start: 2024-09-25

## 2024-09-25 RX ORDER — CEFAZOLIN SODIUM 2 G/50ML
SOLUTION INTRAVENOUS AS NEEDED
Status: DISCONTINUED | OUTPATIENT
Start: 2024-09-25 | End: 2024-09-25

## 2024-09-25 RX ORDER — CHLORHEXIDINE GLUCONATE ORAL RINSE 1.2 MG/ML
15 SOLUTION DENTAL ONCE
Status: COMPLETED | OUTPATIENT
Start: 2024-09-25 | End: 2024-09-25

## 2024-09-25 RX ORDER — ONDANSETRON 2 MG/ML
4 INJECTION INTRAMUSCULAR; INTRAVENOUS ONCE AS NEEDED
Status: DISCONTINUED | OUTPATIENT
Start: 2024-09-25 | End: 2024-09-25 | Stop reason: HOSPADM

## 2024-09-25 RX ORDER — FENTANYL CITRATE/PF 50 MCG/ML
25 SYRINGE (ML) INJECTION
Status: DISCONTINUED | OUTPATIENT
Start: 2024-09-25 | End: 2024-09-25 | Stop reason: HOSPADM

## 2024-09-25 RX ORDER — MAGNESIUM HYDROXIDE 1200 MG/15ML
LIQUID ORAL AS NEEDED
Status: DISCONTINUED | OUTPATIENT
Start: 2024-09-25 | End: 2024-09-25 | Stop reason: HOSPADM

## 2024-09-25 RX ORDER — FENTANYL CITRATE 50 UG/ML
INJECTION, SOLUTION INTRAMUSCULAR; INTRAVENOUS AS NEEDED
Status: DISCONTINUED | OUTPATIENT
Start: 2024-09-25 | End: 2024-09-25

## 2024-09-25 RX ORDER — LIDOCAINE HYDROCHLORIDE 10 MG/ML
INJECTION, SOLUTION EPIDURAL; INFILTRATION; INTRACAUDAL; PERINEURAL AS NEEDED
Status: DISCONTINUED | OUTPATIENT
Start: 2024-09-25 | End: 2024-09-25

## 2024-09-25 RX ORDER — LIDOCAINE HYDROCHLORIDE 10 MG/ML
0.5 INJECTION, SOLUTION EPIDURAL; INFILTRATION; INTRACAUDAL; PERINEURAL ONCE AS NEEDED
Status: DISCONTINUED | OUTPATIENT
Start: 2024-09-25 | End: 2024-09-25 | Stop reason: HOSPADM

## 2024-09-25 RX ORDER — PROPOFOL 10 MG/ML
INJECTION, EMULSION INTRAVENOUS CONTINUOUS PRN
Status: DISCONTINUED | OUTPATIENT
Start: 2024-09-25 | End: 2024-09-25

## 2024-09-25 RX ORDER — SODIUM CHLORIDE, SODIUM LACTATE, POTASSIUM CHLORIDE, CALCIUM CHLORIDE 600; 310; 30; 20 MG/100ML; MG/100ML; MG/100ML; MG/100ML
INJECTION, SOLUTION INTRAVENOUS CONTINUOUS PRN
Status: DISCONTINUED | OUTPATIENT
Start: 2024-09-25 | End: 2024-09-25

## 2024-09-25 RX ADMIN — PROPOFOL 30 MG: 10 INJECTION, EMULSION INTRAVENOUS at 07:29

## 2024-09-25 RX ADMIN — FENTANYL CITRATE 50 MCG: 50 INJECTION INTRAMUSCULAR; INTRAVENOUS at 07:28

## 2024-09-25 RX ADMIN — CHLORHEXIDINE GLUCONATE 15 ML: 1.2 RINSE ORAL at 06:58

## 2024-09-25 RX ADMIN — MIDAZOLAM 2 MG: 1 INJECTION INTRAMUSCULAR; INTRAVENOUS at 07:27

## 2024-09-25 RX ADMIN — CEFAZOLIN SODIUM 2000 MG: 2 SOLUTION INTRAVENOUS at 07:27

## 2024-09-25 RX ADMIN — KETOROLAC TROMETHAMINE 15 MG: 30 INJECTION, SOLUTION INTRAMUSCULAR; INTRAVENOUS at 07:47

## 2024-09-25 RX ADMIN — FENTANYL CITRATE 50 MCG: 50 INJECTION INTRAMUSCULAR; INTRAVENOUS at 07:42

## 2024-09-25 RX ADMIN — PROPOFOL 30 MG: 10 INJECTION, EMULSION INTRAVENOUS at 07:44

## 2024-09-25 RX ADMIN — HYDROCODONE BITARTRATE AND ACETAMINOPHEN 1 TABLET: 5; 325 TABLET ORAL at 08:26

## 2024-09-25 RX ADMIN — SODIUM CHLORIDE, SODIUM LACTATE, POTASSIUM CHLORIDE, AND CALCIUM CHLORIDE: .6; .31; .03; .02 INJECTION, SOLUTION INTRAVENOUS at 07:25

## 2024-09-25 RX ADMIN — PROPOFOL 60 MCG/KG/MIN: 10 INJECTION, EMULSION INTRAVENOUS at 07:29

## 2024-09-25 RX ADMIN — LIDOCAINE HYDROCHLORIDE 50 MG: 10 INJECTION, SOLUTION EPIDURAL; INFILTRATION; INTRACAUDAL; PERINEURAL at 07:29

## 2024-09-25 NOTE — ANESTHESIA POSTPROCEDURE EVALUATION
Post-Op Assessment Note    CV Status:  Stable  Pain Score: 0    Pain management: adequate    Multimodal analgesia used between 6 hours prior to anesthesia start to PACU discharge    Mental Status:  Arousable and alert   Hydration Status:  Stable   PONV Controlled:  None   Airway Patency:  Patent  Two or more mitigation strategies used for obstructive sleep apnea   Post Op Vitals Reviewed: Yes    No anethesia notable event occurred.    Staff: CRNA               BP   105/53   Temp      Pulse 78   Resp 17   SpO2 95

## 2024-09-25 NOTE — OP NOTE
OPERATIVE REPORT  PATIENT NAME: Roxana Huffman    :  1970  MRN: [id number]  Pt [facility]    SURGERY DATE: 2024     Surgeons and Role:     * Gato Hadley MD - Primary     * Mirella Francois PA-C - Assisting    Physician Assistant need: A physician assistant was required during the procedure for retraction, tissue handling, dissection, and suturing. No qualified resident was available.    Pre-Op Diagnosis Codes:      * Carpal tunnel syndrome on right [G56.01]    Post-Op Diagnosis Codes:     * Carpal tunnel syndrome on right [G56.01]    Procedure(s) (LRB):  RELEASE CARPAL TUNNEL ENDOSCOPIC (Right)    Specimen(s):  * No specimens in log *    Estimated Blood Loss:   Minimal    Drains:  None    Implants:  * No implants in log *    Anesthesia Type:   IV Sedation with Anesthesia    Operative Indications:  Patient is a 53 y.o. female evaluated in clinic with symptoms and clinical exam consistent with Right carpal tunnel syndrome.  EMG was performed and consistent with moderate Right carpal tunnel syndrome.  We discussed treatment options with patient including conservative management and surgical decompression. Discussed nonoperative management with splinting, injections, and observation. We discussed with surgery the possibility of continued numbness and tingling after surgery and possible recurrence of symptoms many years down the line. Patient elected for surgical management.      Operative Findings:  Thick transverse carpal ligament that was able to be released endoscopically.       Complications:   None     Procedure and Technique:  Patient was identified in the preoperative holding area.  Surgical sites were marked with patient participation. Patient was taken back to the operating theater.  Anesthesia was induced. Extremity was prepped and draped typical fashion.  Formal time-out was performed confirming site, patient, and procedure. All present were in agreement. A 50-50  mixture of 1% lidocaine without epinephrine and 0.25% bupivacaine without epinephrine was used for local field block.        Tourniquet was inflated. Incision made over proximal transverse wrist crease. Palmaris longus tendon was retracted radially.  Blunt dissection was carried to the antebrachial fascia, which was entered bluntly.  The dilator was inserted below the antebrachial fascia and above the median nerve.  The dilator was taken distally. The endoscope with Particle endoscopic knife was introduced with care to be below antebrachial fascia and above the median nerve. Good visualization was noted.  The transverse carpal ligament fibers were directly visualized.  The transverse carpal ligament was incised using endoscopic knife.  The wound was reinspected.  There was complete release of the transverse carpal ligament.  Attention was turned proximally through the wound.  The distal 1.5 cm aspect of antebrachial fascia was incised under direct visualization.  Wound was inspected and palpated with the hemostat.  There was no further sites of compression. Wound was irrigated. Skin was closed with 4-0 chromic in horizontal mattress fashion.  Wounds were dressed with Xeroform, 4x4s, cast padding, Ace bandage.   Tourniquet was deflated.  Patient was taken to PACU in stable condition.     I was present for the entire procedure     Postoperative Plan:  Patient may range digits as tolerated.  We will limit weightbearing to a couple of pounds for the first 2 weeks.  We will see patient back at the 2-week postoperative [place]o.      Patient Disposition:  Recovery        SIGNATURE: Gato Hadley MD  DATE: September 25, 2024  TIME: 7:50 AM

## 2024-09-25 NOTE — INTERVAL H&P NOTE
H&P reviewed. After examining the patient I find no changes in the patients condition since the H&P had been written.    Vitals:    09/25/24 0654   BP: 151/60   Pulse: 67   Resp: 18   Temp: (!) 96.9 °F (36.1 °C)   SpO2: 96%

## 2024-09-26 ENCOUNTER — ANESTHESIA EVENT (OUTPATIENT)
Dept: PERIOP | Facility: AMBULARY SURGERY CENTER | Age: 54
End: 2024-09-26
Payer: COMMERCIAL

## 2024-09-30 ENCOUNTER — DOCUMENTATION (OUTPATIENT)
Dept: HEMATOLOGY ONCOLOGY | Facility: CLINIC | Age: 54
End: 2024-09-30

## 2024-09-30 ENCOUNTER — E-CONSULT (OUTPATIENT)
Age: 54
End: 2024-09-30
Payer: COMMERCIAL

## 2024-09-30 ENCOUNTER — OFFICE VISIT (OUTPATIENT)
Dept: FAMILY MEDICINE CLINIC | Facility: CLINIC | Age: 54
End: 2024-09-30
Payer: COMMERCIAL

## 2024-09-30 VITALS
HEIGHT: 65 IN | WEIGHT: 168 LBS | RESPIRATION RATE: 11 BRPM | SYSTOLIC BLOOD PRESSURE: 130 MMHG | OXYGEN SATURATION: 95 % | DIASTOLIC BLOOD PRESSURE: 84 MMHG | BODY MASS INDEX: 27.99 KG/M2 | HEART RATE: 77 BPM

## 2024-09-30 DIAGNOSIS — R20.0 NUMBNESS AND TINGLING IN BOTH HANDS: Primary | ICD-10-CM

## 2024-09-30 DIAGNOSIS — R20.2 NUMBNESS AND TINGLING IN BOTH HANDS: Primary | ICD-10-CM

## 2024-09-30 DIAGNOSIS — D72.820 LYMPHOCYTOSIS: ICD-10-CM

## 2024-09-30 DIAGNOSIS — N13.30 HYDROURETERONEPHROSIS: ICD-10-CM

## 2024-09-30 DIAGNOSIS — E55.9 VITAMIN D DEFICIENCY: ICD-10-CM

## 2024-09-30 DIAGNOSIS — E53.8 VITAMIN B12 DEFICIENCY: ICD-10-CM

## 2024-09-30 DIAGNOSIS — D49.2 ATYPICAL SQUAMOPROLIFERATIVE SKIN LESION: ICD-10-CM

## 2024-09-30 DIAGNOSIS — R73.03 PREDIABETES: ICD-10-CM

## 2024-09-30 DIAGNOSIS — E78.2 MIXED HYPERLIPIDEMIA: ICD-10-CM

## 2024-09-30 DIAGNOSIS — M79.601 PARESTHESIA AND PAIN OF BOTH UPPER EXTREMITIES: ICD-10-CM

## 2024-09-30 DIAGNOSIS — M79.602 PARESTHESIA AND PAIN OF BOTH UPPER EXTREMITIES: ICD-10-CM

## 2024-09-30 DIAGNOSIS — Z80.7 FAMILY HISTORY OF MULTIPLE MYELOMA: ICD-10-CM

## 2024-09-30 DIAGNOSIS — R20.2 PARESTHESIA AND PAIN OF BOTH UPPER EXTREMITIES: ICD-10-CM

## 2024-09-30 DIAGNOSIS — R76.8 POSITIVE ANA (ANTINUCLEAR ANTIBODY): Primary | ICD-10-CM

## 2024-09-30 DIAGNOSIS — M25.50 POLYARTHRALGIA: ICD-10-CM

## 2024-09-30 DIAGNOSIS — C44.91 BASAL CELL CARCINOMA (BCC), UNSPECIFIED SITE: ICD-10-CM

## 2024-09-30 PROCEDURE — 99214 OFFICE O/P EST MOD 30 MIN: CPT | Performed by: FAMILY MEDICINE

## 2024-09-30 PROCEDURE — 99447 NTRPROF PH1/NTRNET/EHR 11-20: CPT | Performed by: NURSE PRACTITIONER

## 2024-09-30 RX ORDER — ROSUVASTATIN CALCIUM 5 MG/1
5 TABLET, COATED ORAL DAILY
Qty: 30 TABLET | Refills: 2 | Status: SHIPPED | OUTPATIENT
Start: 2024-09-30

## 2024-09-30 RX ORDER — MAGNESIUM 200 MG
1 TABLET ORAL DAILY
Qty: 90 TABLET | Refills: 1 | Status: SHIPPED | OUTPATIENT
Start: 2024-09-30

## 2024-09-30 RX ORDER — ERGOCALCIFEROL 1.25 MG/1
50000 CAPSULE, LIQUID FILLED ORAL WEEKLY
Qty: 12 CAPSULE | Refills: 1 | Status: SHIPPED | OUTPATIENT
Start: 2024-09-30

## 2024-09-30 NOTE — PATIENT INSTRUCTIONS
Start taking vitamin D2 50,000 unit once a week for 6 months.  You will also start taking calcium 1200 mg and vitamin D3 1000 IUs on a daily basis.  Your B12 was low we will start taking sublingual vitamin B12 1000 mcg over-the-counter.  You will start taking folic acid 800 mcg over-the-counter as well.  This is because B12 needs folic acid to work.    You will follow-up with hematology given family history of multiple myeloma, polyarthritis bone pains as well as lymphocytosis.  It is less likely that you have multiple myeloma but they can work it up.      You have mild prediabetes so avoid carbs.  Usually 50% of patients who do not watch their diet to have to be diabetics over the next 2 years.  Your LDL as well as your triglycerides and total cholesterol were very high and you need to be on a high intensity statin given the family history of stroke it is very very likely that you have familial hypercholesterolemia.  You will also start taking a baby aspirin on a daily basis.  Sometimes baby aspirin causes reflux so the best time to take it is after meals.

## 2024-09-30 NOTE — PROGRESS NOTES
E-Consult  Roxana Huffman 53 y.o. female MRN: [id number]  Encounter Date: 09/30/24      Reason for Consult / Principal Problem: family history of multiple myeloma in maternal grandmother and her mother, lymphocytosis, bone pains, non specific AMBROSIO elevation     Consulting Provider: KEEGAN Rogel    Requesting Provider: Flaquita Chilel MD       ASSESSMENT:  Patient is a 53-year-old female with history of bilateral carpal tunnel syndrome, family history of multiple myeloma and symptoms of multiple joint pains, nonspecific AMBROSIO elevation.  CBC with differential is normal.  No evidence of anemia.  CMP is normal.  No evidence of hypercalcemia or renal insufficiency  Available imaging does not demonstrate any evidence of acute osseous abnormalities on x-rays of her hands  No other imaging that evaluates bony skeleton available for review       RECOMMENDATIONS:  Multiple myeloma is not a hereditary malignancy even though it may run in some families.  Multiple myeloma is a cancer white blood cells, plasma cells which are found primarily in the bone marrow.  The overall proliferation of these cells crowd out healthy blood cells, frequently destroying bone tissue and spreading throughout the body.  Criteria for myeloma or workup for myeloma includes findings such as anemia, renal insufficiency, hypercalcemia, lytic bone lesions.  Patient does not have any of these findings on her most recent blood work.  Serum protein levels are within normal range.    She endorses joint pains, arthralgias.  She has had positive AMBROSIO.  She should undergo rheumatologic evaluation.  She is scheduled for release of carpal tunnel on 10/10/2024.  There are no findings that would indicate pursuing workup for multiple myeloma    CBC with differential is normal.  Percentage of lymphocytes is mildly elevated at 47%, this does not count as true lymphocytosis as her absolute lymphocytes are within normal range.  The slight increase in  lymphocyte percentage could be a reactive finding         Total time spent 11-20 minutes, >50% of the total time devoted to medical consultative verbal/EMR discussion between providers. Written report will be generated in the EMR..

## 2024-09-30 NOTE — PROGRESS NOTES
Subjective:      Patient ID: Roxana Huffman is a 53 y.o. female.    Carpal tunnel- right wrist, s/p release  Multiple joint pain-knees, and hands predominant  Family history of multiple myeloma in grandfather and mother  Sister had a stroke under 50 years of age  Basal cell cancer -s/p Moh's        Past Medical History:   Diagnosis Date    Allergic     Seasonal    Anxiety     Cancer (HCC)     Skin cancer removed from forehead February 2020    Genital warts 1992    GERD (gastroesophageal reflux disease)     Hydroureteronephrosis     Skin cancer     removed 2 2020 from face    Varicella     as a baby       Family History   Problem Relation Age of Onset    Multiple myeloma Mother     Cancer Mother         Multiple Myeloma    Depression Mother     Stroke Sister     Depression Sister     Multiple myeloma Maternal Grandfather     Stroke Paternal Grandfather     Pancreatic cancer Maternal Uncle        Past Surgical History:   Procedure Laterality Date    HYSTERECTOMY  04/01/2015    still has ovaries    [facility] CSTC PRTL W/RIMPLTJ URTR IN BLDR URTRONEOCSTOST Left 5/24/2017    Procedure: OPEN URETERAL RE-IMPLANT ;  Surgeon: Darryl Azevedo MD;  [facility];  Service: Urology    [facility] CYSTO BLADDER W/URETERAL CATHETERIZATION Left 5/9/2017    Procedure: CYSTOSCOPY RETROGRADE PYELOGRAM WITH INSERTION STENT URETERAL;  Surgeon: Darryl Azevedo MD;  [facility];  Service: Urology    VA CYSTO W/URTROSCOPY&/PYELOSCOPY DX Left 5/9/2017    Procedure: URETEROSCOPY;  Surgeon: Darryl Azevedo MD;  [facility];  Service: Urology    [facility] NDSC WRST SURG W/RLS TRANSVRS CARPL LIGM Right 9/25/2024    Procedure: RELEASE CARPAL TUNNEL ENDOSCOPIC;  Surgeon: Gato Hadley MD;  [facility];  Service: Orthopedics        reports that she quit smoking about 11 years ago. Her smoking use included cigarettes. She started smoking about 31 years ago. She has a 10 pack-year smoking history. She has never used  smokeless tobacco. She reports current alcohol use of about 6.0 standard drinks of alcohol per week. She reports that she does not currently use drugs after having used the following drugs: Marijuana.      Current Outpatient Medications:     clotrimazole (LOTRIMIN) 1 % cream, Apply topically 2 (two) times a day, Disp: 45 g, Rfl: 0    Cyanocobalamin (B-12) 1000 MCG SUBL, Place 1 tablet (1,000 mcg total) under the tongue daily, Disp: 90 tablet, Rfl: 1    ergocalciferol (VITAMIN D2) 50,000 units, Take 1 capsule (50,000 Units total) by mouth once a week, Disp: 12 capsule, Rfl: 1    HYDROcodone-acetaminophen (NORCO) 5-325 mg per tablet, Take 1 tablet by mouth every 6 (six) hours as needed for pain for up to 5 doses Max Daily Amount: 4 tablets, Disp: 5 tablet, Rfl: 0    ibuprofen (MOTRIN) 800 mg tablet, Take 1 tablet (800 mg total) by mouth every 8 (eight) hours as needed for mild pain or moderate pain, Disp: 30 tablet, Rfl: 0    magnesium Oxide (MAG-OX) 400 mg TABS, Take 1 tablet (400 mg total) by mouth daily, Disp: 30 tablet, Rfl: 1    Multiple Vitamin (Multivitamin Adult) TABS, Take 1 capsule by mouth daily, Disp: , Rfl:     nystatin (MYCOSTATIN) powder, Apply topically 3 (three) times a day, Disp: 60 g, Rfl: 2    rosuvastatin (CRESTOR) 5 mg tablet, Take 1 tablet (5 mg total) by mouth daily, Disp: 30 tablet, Rfl: 2    The following portions of the patient's history were reviewed and updated as appropriate: allergies, current medications, past family history, past medical history, past social history, past surgical history and problem list.    Review of Systems   Constitutional:  Negative for fatigue and fever.   HENT:  Negative for congestion, facial swelling, mouth sores, rhinorrhea, sore throat and trouble swallowing.    Eyes:  Negative for pain and redness.   Respiratory:  Negative for cough, shortness of breath and wheezing.    Cardiovascular:  Negative for chest pain, palpitations and leg swelling.  "  Gastrointestinal:  Negative for abdominal pain, blood in stool, constipation, diarrhea and nausea.   Genitourinary:  Negative for dysuria, hematuria and urgency.   Musculoskeletal:  Positive for arthralgias. Negative for back pain and myalgias.   Skin:  Negative for rash and wound.   Neurological:  Negative for seizures, syncope and headaches.   Hematological:  Negative for adenopathy.   Psychiatric/Behavioral:  Negative for agitation and behavioral problems.          PHQ-2/9 Depression Screening                 Objective:    /84 (BP Location: Left arm, Patient Position: Sitting, Cuff Size: Standard)   Pulse 77   Resp (!) 11   Ht 5' 5\" (1.651 m)   Wt 76.2 kg (168 lb)   SpO2 95%   BMI 27.96 kg/m²      Physical Exam  Vitals and nursing note reviewed.   Constitutional:       Appearance: Normal appearance. She is well-developed.   HENT:      Head: Normocephalic and atraumatic.      Right Ear: External ear normal.      Left Ear: External ear normal.      Nose: Nose normal.   Eyes:      General: No scleral icterus.        Right eye: No discharge.         Left eye: No discharge.      Conjunctiva/sclera: Conjunctivae normal.      Pupils: Pupils are equal, round, and reactive to light.   Neck:      Thyroid: No thyromegaly.   Cardiovascular:      Rate and Rhythm: Normal rate and regular rhythm.      Heart sounds: Normal heart sounds. No murmur heard.     No gallop.   Pulmonary:      Effort: Pulmonary effort is normal.      Breath sounds: Normal breath sounds. No wheezing or rales.   Abdominal:      General: There is no distension.      Palpations: Abdomen is soft.      Tenderness: There is no abdominal tenderness.   Musculoskeletal:         General: Swelling and tenderness present. No deformity.      Cervical back: Normal range of motion and neck supple.      Right lower leg: No edema.      Left lower leg: No edema.   Lymphadenopathy:      Cervical: No cervical adenopathy.   Skin:     Findings: Lesion (2 pearly " nodules with blood vessel, on right cheek) present. No erythema or rash.   Neurological:      General: No focal deficit present.      Mental Status: She is alert. Mental status is at baseline.   Psychiatric:         Mood and Affect: Mood normal.         Behavior: Behavior normal.           Recent Results (from the past 8736 hour(s))   AMBROSIO Screen w/ Reflex to Titer/Pattern    Collection Time: 09/05/24  7:28 AM   Result Value Ref Range    AMBROSIO Positive (A) Negative   RF Screen w/ Reflex to Titer    Collection Time: 09/05/24  7:28 AM   Result Value Ref Range    Rheumatoid Factor Negative Negative   Vitamin B12    Collection Time: 09/05/24  7:28 AM   Result Value Ref Range    Vitamin B-12 194 180 - 914 pg/mL   Folate    Collection Time: 09/05/24  7:28 AM   Result Value Ref Range    Folate 9.5 >5.9 ng/mL   Vitamin B1, whole blood    Collection Time: 09/05/24  7:28 AM   Result Value Ref Range    Vitamin B1, Whole Blood 130.8 66.5 - 200.0 nmol/L   Uric acid    Collection Time: 09/05/24  7:28 AM   Result Value Ref Range    Uric Acid 6.7 2.0 - 7.5 mg/dL   Sedimentation rate, automated    Collection Time: 09/05/24  7:28 AM   Result Value Ref Range    Sed Rate 7 0 - 29 mm/hour   C-reactive protein    Collection Time: 09/05/24  7:28 AM   Result Value Ref Range    CRP 1.1 <3.0 mg/L   Lipid panel    Collection Time: 09/05/24  7:28 AM   Result Value Ref Range    Cholesterol 300 (H) See Comment mg/dL    Triglycerides 222 (H) See Comment mg/dL    HDL, Direct 58 >=50 mg/dL    LDL Calculated 198 (H) 0 - 100 mg/dL    Non-HDL-Chol (CHOL-HDL) 242 mg/dl   TSH, 3rd generation with Free T4 reflex    Collection Time: 09/05/24  7:28 AM   Result Value Ref Range    TSH 3RD GENERATON 3.627 0.450 - 4.500 uIU/mL   Comprehensive metabolic panel    Collection Time: 09/05/24  7:28 AM   Result Value Ref Range    Sodium 139 135 - 147 mmol/L    Potassium 4.2 3.5 - 5.3 mmol/L    Chloride 103 96 - 108 mmol/L    CO2 26 21 - 32 mmol/L    ANION GAP 10 4 - 13  mmol/L    BUN 17 5 - 25 mg/dL    Creatinine 0.98 0.60 - 1.30 mg/dL    Glucose, Fasting 94 65 - 99 mg/dL    Calcium 9.2 8.4 - 10.2 mg/dL    AST 20 13 - 39 U/L    ALT 15 7 - 52 U/L    Alkaline Phosphatase 79 34 - 104 U/L    Total Protein 7.2 6.4 - 8.4 g/dL    Albumin 4.3 3.5 - 5.0 g/dL    Total Bilirubin 0.42 0.20 - 1.00 mg/dL    eGFR 66 ml/min/1.73sq m   CBC and differential    Collection Time: 09/05/24  7:28 AM   Result Value Ref Range    WBC 5.26 4.31 - 10.16 Thousand/uL    RBC 4.56 3.81 - 5.12 Million/uL    Hemoglobin 14.2 11.5 - 15.4 g/dL    Hematocrit 42.7 34.8 - 46.1 %    MCV 94 82 - 98 fL    MCH 31.1 26.8 - 34.3 pg    MCHC 33.3 31.4 - 37.4 g/dL    RDW 12.3 11.6 - 15.1 %    MPV 9.6 8.9 - 12.7 fL    Platelets 274 149 - 390 Thousands/uL    nRBC 0 /100 WBCs    Segmented % 39 (L) 43 - 75 %    Immature Grans % 0 0 - 2 %    Lymphocytes % 47 (H) 14 - 44 %    Monocytes % 7 4 - 12 %    Eosinophils Relative 5 0 - 6 %    Basophils Relative 2 (H) 0 - 1 %    Absolute Neutrophils 2.04 1.85 - 7.62 Thousands/µL    Absolute Immature Grans 0.01 0.00 - 0.20 Thousand/uL    Absolute Lymphocytes 2.46 0.60 - 4.47 Thousands/µL    Absolute Monocytes 0.38 0.17 - 1.22 Thousand/µL    Eosinophils Absolute 0.27 0.00 - 0.61 Thousand/µL    Basophils Absolute 0.10 0.00 - 0.10 Thousands/µL   Hemoglobin A1C    Collection Time: 09/05/24  7:28 AM   Result Value Ref Range    Hemoglobin A1C 5.8 (H) Normal 4.0-5.6%; PreDiabetic 5.7-6.4%; Diabetic >=6.5%; Glycemic control for adults with diabetes <7.0% %     mg/dl   Vitamin D 25 hydroxy    Collection Time: 09/05/24  7:28 AM   Result Value Ref Range    Vit D, 25-Hydroxy 22.6 (L) 30.0 - 100.0 ng/mL   AMBROSIO Titer (Reflex test-do not order)    Collection Time: 09/05/24  7:28 AM   Result Value Ref Range    AMBROSIO Titer 1 Titer of 40     AMBROSIO Pattern 1 Un-typeable pattern    EKG 12 lead    Collection Time: 09/17/24  7:32 AM   Result Value Ref Range    Ventricular Rate 54 BPM    Atrial Rate 54 BPM    AR  Interval 202 ms    QRSD Interval 80 ms    QT Interval 446 ms    QTC Interval 422 ms    P [place] 37 degrees    QRS Axis 6 degrees    T Wave Axis 31 degrees       Laboratory Results: I have personally reviewed the pertinent laboratory results/reports         Assessment/Plan:  Problem List Items Addressed This Visit    None  Visit Diagnoses       Positive AMBROSIO (antinuclear antibody)    -  Primary    Relevant Orders    Ambulatory Referral to Rheumatology    Prediabetes        Mixed hyperlipidemia        Relevant Medications    rosuvastatin (CRESTOR) 5 mg tablet    Lymphocytosis        Relevant Medications    Cyanocobalamin (B-12) 1000 MCG SUBL    Other Relevant Orders    Ambulatory Referral to Hematology / Oncology    Polyarthralgia        Relevant Orders    Ambulatory Referral to Rheumatology    Ambulatory Referral to Hematology / Oncology    Family history of multiple myeloma        Relevant Orders    Ambulatory Referral to Hematology / Oncology    Vitamin D deficiency        Relevant Medications    ergocalciferol (VITAMIN D2) 50,000 units    Vitamin B12 deficiency        Relevant Medications    Cyanocobalamin (B-12) 1000 MCG SUBL          Start taking vitamin D2 50,000 unit once a week for 6 months.  You will also start taking calcium 1200 mg and vitamin D3 1000 IUs on a daily basis.  Your B12 was low we will start taking sublingual vitamin B12 1000 mcg over-the-counter.  You will start taking folic acid 800 mcg over-the-counter as well.  This is because B12 needs folic acid to work.    You will follow-up with hematology given family history of multiple myeloma, polyarthritis bone pains as well as lymphocytosis.will refer to hematology. After the visit, I got a notification from [facility]e onc ONN, who closed the referral, asking me to put an amb e- referral to [facility], which I did.  Given her history of basal cell and new lesion-refer to dermatology.  AMBROSIO positive-follow up with rheumatologist.               Read package  "inserts for all medications before starting a new medications, call me if you have any questions.    Patient was given opportunity to ask questions and all questions were answered.    Disclaimer: Portions of the record may have been created with voice recognition software. Occasional wrong word or \"sound a like\" substitutions may have occurred due to the inherent limitations of voice recognition software. Read the chart carefully and recognize, using context, where substitutions have occurred. I have used the Epic copy/forward function to compose this note. I have reviewed my current note to ensure it reflects the current patient status, exam, assessment and plan.    "

## 2024-09-30 NOTE — PROGRESS NOTES
Please place e consult for further workup recommendations.     Chart was clinically reviewed by ONN and at this time the referral to hematology-oncology will be closed, as there is limited work-up for the specified referral diagnosis. Please place an Amb e-consult to hematology if felt [id number] communication will assist with recommendations for additional testing/work-up and notify the patient.  The e-consult will be completed within 7 business days.  Thank you!

## 2024-10-01 ENCOUNTER — TELEPHONE (OUTPATIENT)
Dept: FAMILY MEDICINE CLINIC | Facility: CLINIC | Age: 54
End: 2024-10-01

## 2024-10-01 NOTE — PROGRESS NOTES
So Inform the patient that Hematology does not want to see her and someone from hematology told me to do an e-consult which is essentially someone reviewing the record and telling me what needs to be done.   The record was reviewed by hematology Non physician practitioner and the consult states no further work up is warranted for multiple myeloma at this time.  I would suggest that for now we can monitor for additional signs on labs and if needed we will re-consult- probably at another place.

## 2024-10-01 NOTE — TELEPHONE ENCOUNTER
So Inform the patient that Hematology does not want to see her and someone from hematology told me to do an e-consult which is essentially someone reviewing the record and telling me what needs to be done.   The record was reviewed by hematology Non physician practitioner and the consult states no further work up is warranted for multiple myeloma at this time.  I would suggest that for now we can monitor for additional signs on labs and if needed we will re-consult- probably at another place.          View previous messages

## 2024-10-08 ENCOUNTER — OFFICE VISIT (OUTPATIENT)
Dept: OBGYN CLINIC | Facility: CLINIC | Age: 54
End: 2024-10-08

## 2024-10-08 VITALS
DIASTOLIC BLOOD PRESSURE: 73 MMHG | HEIGHT: 65 IN | HEART RATE: 66 BPM | WEIGHT: 168 LBS | BODY MASS INDEX: 27.99 KG/M2 | SYSTOLIC BLOOD PRESSURE: 111 MMHG

## 2024-10-08 DIAGNOSIS — Z98.890 POSTOPERATIVE STATE: Primary | ICD-10-CM

## 2024-10-08 PROCEDURE — 99024 POSTOP FOLLOW-UP VISIT: CPT | Performed by: STUDENT IN AN ORGANIZED HEALTH CARE EDUCATION/TRAINING PROGRAM

## 2024-10-08 NOTE — PROGRESS NOTES
Assessment/Plan:  Patient ID: 53 y.o. female   Surgery: Release Carpal Tunnel Endoscopic - Right  Date of Surgery: 9/25/2024    Pt overall doing well.  She can proceed with activities as tolerated.  Discussed scar massage.  Pt is scheduled for the L side this Thursday and would like to proceed with this.    Follow Up:  After surgery    To Do Next Visit:  Re-evaluation of current issue      CHIEF COMPLAINT:  Chief Complaint   Patient presents with    Right Hand - Pain, Post-op         SUBJECTIVE:  Patient is a 53 y.o. year old female who presents for follow up after Release Carpal Tunnel Endoscopic - Right. Today patient states the hand feels a little tight and slight electric-current sensation. Pt states n/t has resolved for the most part. No more nocturnal symptoms.     Occupation: Catering business    PHYSICAL EXAMINATION:  General: Well developed and well nourished, alert and oriented x 3, appears comfortable  Psychiatric: Normal    MUSCULOSKELETAL EXAMINATION:  Right hand:  Incision: Clean, dry, intact  Surgery Site: normal, no evidence of infection   Range of Motion: As expected and full composite fist possible  Neurovascular status: Neuro intact, good cap refill, 5/5 APB     General: Well developed and well nourished, alert & oriented x 3, appears comfortable  Psychiatric: Normal  HEENT: Normocephalic, Atraumatic Trachea Midline, No torticollis  Pulmonary: No audible wheezing or respiratory distress   Abdomen/GI: Non tender, non distended   Cardiovascular: No pitting edema, 2+ radial pulse       STUDIES REVIEWED:  No new studies to review       PROCEDURES PERFORMED:  Procedures  No Procedures performed today    Scribe Attestation      I,:  Mirella Francois PA-C am acting as a scribe while in the presence of the attending physician.:       I,:  Gato Hadley MD personally performed the services described in this documentation    as scribed in my presence.:

## 2024-10-10 ENCOUNTER — ANESTHESIA (OUTPATIENT)
Dept: PERIOP | Facility: AMBULARY SURGERY CENTER | Age: 54
End: 2024-10-10
Payer: COMMERCIAL

## 2024-10-10 ENCOUNTER — HOSPITAL ENCOUNTER (OUTPATIENT)
Facility: AMBULARY SURGERY CENTER | Age: 54
Setting detail: OUTPATIENT SURGERY
Discharge: HOME | End: 2024-10-10
Attending: STUDENT IN AN ORGANIZED HEALTH CARE EDUCATION/TRAINING PROGRAM | Admitting: STUDENT IN AN ORGANIZED HEALTH CARE EDUCATION/TRAINING PROGRAM
Payer: COMMERCIAL

## 2024-10-10 VITALS
WEIGHT: 165.2 LBS | RESPIRATION RATE: 17 BRPM | OXYGEN SATURATION: 96 % | DIASTOLIC BLOOD PRESSURE: 70 MMHG | TEMPERATURE: 97.6 F | HEART RATE: 68 BPM | BODY MASS INDEX: 27.49 KG/M2 | SYSTOLIC BLOOD PRESSURE: 136 MMHG

## 2024-10-10 DIAGNOSIS — G56.03 BILATERAL CARPAL TUNNEL SYNDROME: Primary | ICD-10-CM

## 2024-10-10 PROCEDURE — 29848 WRIST ENDOSCOPY/SURGERY: CPT | Performed by: PHYSICIAN ASSISTANT

## 2024-10-10 PROCEDURE — 29848 WRIST ENDOSCOPY/SURGERY: CPT | Performed by: STUDENT IN AN ORGANIZED HEALTH CARE EDUCATION/TRAINING PROGRAM

## 2024-10-10 RX ORDER — ONDANSETRON 2 MG/ML
4 INJECTION INTRAMUSCULAR; INTRAVENOUS ONCE AS NEEDED
Status: DISCONTINUED | OUTPATIENT
Start: 2024-10-10 | End: 2024-10-10 | Stop reason: HOSPADM

## 2024-10-10 RX ORDER — CEFAZOLIN SODIUM 2 G/50ML
2000 SOLUTION INTRAVENOUS ONCE
Status: COMPLETED | OUTPATIENT
Start: 2024-10-10 | End: 2024-10-10

## 2024-10-10 RX ORDER — CHLORHEXIDINE GLUCONATE ORAL RINSE 1.2 MG/ML
15 SOLUTION DENTAL ONCE
Status: COMPLETED | OUTPATIENT
Start: 2024-10-10 | End: 2024-10-10

## 2024-10-10 RX ORDER — MAGNESIUM HYDROXIDE 1200 MG/15ML
LIQUID ORAL AS NEEDED
Status: DISCONTINUED | OUTPATIENT
Start: 2024-10-10 | End: 2024-10-10 | Stop reason: HOSPADM

## 2024-10-10 RX ORDER — SODIUM CHLORIDE, SODIUM LACTATE, POTASSIUM CHLORIDE, CALCIUM CHLORIDE 600; 310; 30; 20 MG/100ML; MG/100ML; MG/100ML; MG/100ML
125 INJECTION, SOLUTION INTRAVENOUS CONTINUOUS
Status: DISCONTINUED | OUTPATIENT
Start: 2024-10-10 | End: 2024-10-10 | Stop reason: HOSPADM

## 2024-10-10 RX ORDER — KETOROLAC TROMETHAMINE 30 MG/ML
30 INJECTION, SOLUTION INTRAMUSCULAR; INTRAVENOUS ONCE
Status: COMPLETED | OUTPATIENT
Start: 2024-10-10 | End: 2024-10-10

## 2024-10-10 RX ORDER — ACETAMINOPHEN 10 MG/ML
1000 INJECTION, SOLUTION INTRAVENOUS ONCE
Status: COMPLETED | OUTPATIENT
Start: 2024-10-10 | End: 2024-10-10

## 2024-10-10 RX ORDER — DEXAMETHASONE SODIUM PHOSPHATE 10 MG/ML
INJECTION, SOLUTION INTRAMUSCULAR; INTRAVENOUS AS NEEDED
Status: DISCONTINUED | OUTPATIENT
Start: 2024-10-10 | End: 2024-10-10

## 2024-10-10 RX ORDER — OXYCODONE HYDROCHLORIDE 5 MG/1
5 TABLET ORAL ONCE
Status: COMPLETED | OUTPATIENT
Start: 2024-10-10 | End: 2024-10-10

## 2024-10-10 RX ORDER — FENTANYL CITRATE 50 UG/ML
INJECTION, SOLUTION INTRAMUSCULAR; INTRAVENOUS AS NEEDED
Status: DISCONTINUED | OUTPATIENT
Start: 2024-10-10 | End: 2024-10-10

## 2024-10-10 RX ORDER — PROPOFOL 10 MG/ML
INJECTION, EMULSION INTRAVENOUS AS NEEDED
Status: DISCONTINUED | OUTPATIENT
Start: 2024-10-10 | End: 2024-10-10

## 2024-10-10 RX ORDER — FENTANYL CITRATE/PF 50 MCG/ML
25 SYRINGE (ML) INJECTION
Status: DISCONTINUED | OUTPATIENT
Start: 2024-10-10 | End: 2024-10-10 | Stop reason: HOSPADM

## 2024-10-10 RX ORDER — HYDROCODONE BITARTRATE AND ACETAMINOPHEN 5; 325 MG/1; MG/1
1 TABLET ORAL EVERY 6 HOURS PRN
Qty: 5 TABLET | Refills: 0 | Status: SHIPPED | OUTPATIENT
Start: 2024-10-10 | End: 2024-10-20

## 2024-10-10 RX ORDER — MIDAZOLAM HYDROCHLORIDE 2 MG/2ML
INJECTION, SOLUTION INTRAMUSCULAR; INTRAVENOUS AS NEEDED
Status: DISCONTINUED | OUTPATIENT
Start: 2024-10-10 | End: 2024-10-10

## 2024-10-10 RX ORDER — HYDROMORPHONE HCL/PF 1 MG/ML
0.2 SYRINGE (ML) INJECTION
Status: DISCONTINUED | OUTPATIENT
Start: 2024-10-10 | End: 2024-10-10 | Stop reason: HOSPADM

## 2024-10-10 RX ORDER — PROPOFOL 10 MG/ML
INJECTION, EMULSION INTRAVENOUS CONTINUOUS PRN
Status: DISCONTINUED | OUTPATIENT
Start: 2024-10-10 | End: 2024-10-10

## 2024-10-10 RX ORDER — LIDOCAINE HYDROCHLORIDE 10 MG/ML
0.5 INJECTION, SOLUTION EPIDURAL; INFILTRATION; INTRACAUDAL; PERINEURAL ONCE AS NEEDED
Status: DISCONTINUED | OUTPATIENT
Start: 2024-10-10 | End: 2024-10-10 | Stop reason: HOSPADM

## 2024-10-10 RX ORDER — ONDANSETRON 2 MG/ML
INJECTION INTRAMUSCULAR; INTRAVENOUS AS NEEDED
Status: DISCONTINUED | OUTPATIENT
Start: 2024-10-10 | End: 2024-10-10

## 2024-10-10 RX ADMIN — ONDANSETRON 4 MG: 2 INJECTION INTRAMUSCULAR; INTRAVENOUS at 09:15

## 2024-10-10 RX ADMIN — KETOROLAC TROMETHAMINE 30 MG: 30 INJECTION, SOLUTION INTRAMUSCULAR; INTRAVENOUS at 08:39

## 2024-10-10 RX ADMIN — MIDAZOLAM 2 MG: 1 INJECTION INTRAMUSCULAR; INTRAVENOUS at 07:29

## 2024-10-10 RX ADMIN — PROPOFOL 30 MG: 10 INJECTION, EMULSION INTRAVENOUS at 07:32

## 2024-10-10 RX ADMIN — HYDROMORPHONE HYDROCHLORIDE 0.2 MG: 1 INJECTION, SOLUTION INTRAMUSCULAR; INTRAVENOUS; SUBCUTANEOUS at 08:19

## 2024-10-10 RX ADMIN — HYDROMORPHONE HYDROCHLORIDE 0.2 MG: 1 INJECTION, SOLUTION INTRAMUSCULAR; INTRAVENOUS; SUBCUTANEOUS at 08:12

## 2024-10-10 RX ADMIN — FENTANYL CITRATE 50 MCG: 50 INJECTION INTRAMUSCULAR; INTRAVENOUS at 07:49

## 2024-10-10 RX ADMIN — PROPOFOL 150 MCG/KG/MIN: 10 INJECTION, EMULSION INTRAVENOUS at 07:33

## 2024-10-10 RX ADMIN — HYDROMORPHONE HYDROCHLORIDE 0.2 MG: 1 INJECTION, SOLUTION INTRAMUSCULAR; INTRAVENOUS; SUBCUTANEOUS at 08:31

## 2024-10-10 RX ADMIN — FENTANYL CITRATE 25 MCG: 50 INJECTION INTRAMUSCULAR; INTRAVENOUS at 08:01

## 2024-10-10 RX ADMIN — SODIUM CHLORIDE, SODIUM LACTATE, POTASSIUM CHLORIDE, AND CALCIUM CHLORIDE: .6; .31; .03; .02 INJECTION, SOLUTION INTRAVENOUS at 07:02

## 2024-10-10 RX ADMIN — DEXAMETHASONE SODIUM PHOSPHATE 10 MG: 10 INJECTION, SOLUTION INTRAMUSCULAR; INTRAVENOUS at 07:35

## 2024-10-10 RX ADMIN — CEFAZOLIN SODIUM 2000 MG: 2 SOLUTION INTRAVENOUS at 07:29

## 2024-10-10 RX ADMIN — ACETAMINOPHEN 1000 MG: 10 INJECTION INTRAVENOUS at 08:45

## 2024-10-10 RX ADMIN — OXYCODONE HYDROCHLORIDE 5 MG: 5 TABLET ORAL at 10:19

## 2024-10-10 RX ADMIN — HYDROMORPHONE HYDROCHLORIDE 0.2 MG: 1 INJECTION, SOLUTION INTRAMUSCULAR; INTRAVENOUS; SUBCUTANEOUS at 08:24

## 2024-10-10 RX ADMIN — ONDANSETRON 4 MG: 2 INJECTION INTRAMUSCULAR; INTRAVENOUS at 07:29

## 2024-10-10 RX ADMIN — FENTANYL CITRATE 25 MCG: 50 INJECTION INTRAMUSCULAR; INTRAVENOUS at 07:32

## 2024-10-10 RX ADMIN — CHLORHEXIDINE GLUCONATE 15 ML: 1.2 RINSE ORAL at 07:06

## 2024-10-10 RX ADMIN — FENTANYL CITRATE 25 MCG: 50 INJECTION INTRAMUSCULAR; INTRAVENOUS at 07:44

## 2024-10-10 RX ADMIN — FENTANYL CITRATE 25 MCG: 50 INJECTION INTRAMUSCULAR; INTRAVENOUS at 08:06

## 2024-10-10 NOTE — INTERVAL H&P NOTE
H&P reviewed. After examining the patient I find no changes in the patients condition since the H&P had been written.    Vitals:    10/10/24 0654   BP: 121/73   Pulse: 58   Resp: 16   Temp: 97.9 °F (36.6 °C)   SpO2: 97%

## 2024-10-10 NOTE — DISCHARGE SUMMARY
Discharge Summary - Orthopedics   Name: Roxana Huffman 53 y.o. female I MRN: [id number]  Unit/Bed#: OR POOL I Date of Admission: 10/10/2024   Date of Service: 10/10/2024 I Hospital Day: 0  { ?Quick Links I Problem List I PORISAAC I Billing Tip:41009}  {sl ip specialty discharge summaries:10216}      Post Operative Instructions    You have had surgery on your arm today, please read and follow the information below:  Elevate your hand above your elbow during the next 24-48 hours to help with swelling.  Place your hand and arm over your head with motion at your shoulder three times a day.  Do not apply any cream/ointment/oil to your incisions including antibiotics (I.e.Neosporin)  Do not use peroxide to clean your wound   Do not soak your hands in standing water (dishwater, tubs, Jacuzzi's, pools, etc.) until given permission (typically 2-3 weeks after injury)    Call the office if you notice any:  Increased numbness or tingling of your hand or fingers that is not relieved with elevation.  Increasing pain that is not controlled with medication.  Difficulty chewing, breathing, swallowing.  Chest pains or shortness of breath.  Fever over 101.4 degrees.    Bandage: Do NOT remove bandage until follow-up appointment.    Motion: Move fingers into a fist 5 times a day, DO NOT move any splinted fingers.    Weight bearing status: Avoid heavy lifting (>2 pounds) with the extremity that was operated on until follow up appointment. Normal activities of daily living are OK.    Ice: Ice for 10 minutes every hour as needed for swelling x 24 hours.    Sling: No sling necessary.    Pain medication:   Norco/Hydrocodone one tab every 6 hours AS NEEDED for pain  *   You may take Tylenol (acetaminophen) in addition to your pain medication. This is over the counter. Please follow the instructions on the bottle. BE AWARE - your pain medication (hydrocodone/oxycodone) may already include acetaminophen in it. If it does, you must include this  in your daily amount and make sure not take more than 3000 mg per day.     Antibiotics:  None     Therapy: No therapy needed at this time.    Follow-up Appointment: 10-14 days.        Please call the office if you have any questions or concerns regarding your post-operative care.

## 2024-10-10 NOTE — OP NOTE
OPERATIVE REPORT  PATIENT NAME: Roxana Huffman    :  1970  MRN: [id number]  Pt Location: [facility] OR [facility]OM 05    SURGERY DATE: 10/10/2024     Surgeons and Role:  Gato Hadley MD - Primary  VINAYAK Clemens - Assisting    Physician Assistant need: A physician assistant was required during the procedure for retraction, tissue handling, dissection, and suturing. No qualified resident was available.    Pre-Op Diagnosis Codes:      * Carpal tunnel syndrome on left [G56.02]    Post-Op Diagnosis Codes:     * Carpal tunnel syndrome on left [G56.02]    Procedure(s) (LRB):  RELEASE CARPAL TUNNEL ENDOSCOPIC (Left)    Specimen(s):  * No specimens in log *    Estimated Blood Loss:   Minimal    Drains:  None    Implants:  * No implants in log *    Anesthesia Type:   IV Sedation with Anesthesia    Operative Indications:  Patient is a 53 y.o. female evaluated in clinic with symptoms and clinical exam consistent with Left carpal tunnel syndrome.  EMG was performed and consistent with mild Left carpal tunnel syndrome.  We discussed treatment options with patient including conservative management and surgical decompression. Discussed nonoperative management with splinting, injections, and observation. We discussed with surgery the possibility of continued numbness and tingling after surgery and possible recurrence of symptoms many years down the line. Patient elected for surgical management.      Operative Findings:  Thick transverse carpal ligament that was able to be released endoscopically.       Complications:   None     Procedure and Technique:  Patient was identified in the preoperative holding area.  Surgical sites were marked with patient participation. Patient was taken back to the operating theater.  Anesthesia was induced. Extremity was prepped and draped typical fashion.  Formal time-out was performed confirming site, patient, and procedure. All present were in agreement. A 50-50 mixture of 1% lidocaine  without epinephrine and 0.25% bupivacaine without epinephrine was used for local field block.        Tourniquet was inflated. Incision made over proximal transverse wrist crease. Palmaris longus tendon was retracted radially.  Blunt dissection was carried to the antebrachial fascia, which was entered bluntly.  The dilator was inserted below the antebrachial fascia and above the median nerve.  The dilator was taken distally. The endoscope with Mobilygen endoscopic knife was introduced with care to be below antebrachial fascia and above the median nerve. Good visualization was noted.  The transverse carpal ligament fibers were directly visualized.  The transverse carpal ligament was incised using endoscopic knife.  The wound was reinspected.  There was complete release of the transverse carpal ligament.  Attention was turned proximally through the wound.  The distal 1.5 cm aspect of antebrachial fascia was incised under direct visualization.  Wound was inspected and palpated with the hemostat.  There was no further sites of compression. Wound was irrigated. Skin was closed with 4-0 chromic in horizontal mattress fashion.  Wounds were dressed with Xeroform, 4x4s, cast padding, Ace bandage.   Tourniquet was deflated.  Patient was taken to PACU in stable condition.     I was present for the entire procedure     Postoperative Plan:  Patient may range digits as tolerated.  We will limit weightbearing to a couple of pounds for the first 2 weeks.  We will see patient back at the 2-week postoperative [place].      Patient Disposition:  Recovery        SIGNATURE: Gato Hadley MD  DATE: October 10, 2024  TIME: 8:04 AM

## 2024-10-10 NOTE — ANESTHESIA POSTPROCEDURE EVALUATION
Post-Op Assessment Note    CV Status:  Stable  Pain Score: 0    Pain management: adequate       Mental Status:  Sleepy and arousable   PONV Controlled:  None   Airway Patency:  Patent     Post Op Vitals Reviewed: Yes    No anethesia notable event occurred.    Staff: CRNA           Last Filed PACU Vitals:  Vitals Value Taken Time   Temp 97.9    Pulse 81    /57    Resp 12    SpO2 95        Modified Patricia:  No data recorded

## 2024-10-10 NOTE — DISCHARGE INSTR - AVS FIRST PAGE
Post Operative Instructions    You have had surgery on your arm today, please read and follow the information below:  Elevate your hand above your elbow during the next 24-48 hours to help with swelling.  Place your hand and arm over your head with motion at your shoulder three times a day.  Do not apply any cream/ointment/oil to your incisions including antibiotics (I.e.Neosporin)  Do not use peroxide to clean your wound   Do not soak your hands in standing water (dishwater, tubs, Jacuzzi's, pools, etc.) until given permission (typically 2-3 weeks after injury)    Call the office if you notice any:  Increased numbness or tingling of your hand or fingers that is not relieved with elevation.  Increasing pain that is not controlled with medication.  Difficulty chewing, breathing, swallowing.  Chest pains or shortness of breath.  Fever over 101.4 degrees.    Bandage: Do NOT remove bandage until follow-up appointment.    Motion: Move fingers into a fist 5 times a day, DO NOT move any splinted fingers.    Weight bearing status: Avoid heavy lifting (>2 pounds) with the extremity that was operated on until follow up appointment. Normal activities of daily living are OK.    Ice: Ice for 10 minutes every hour as needed for swelling x 24 hours.    Sling: No sling necessary.    Pain medication:   Norco/Hydrocodone one tab every 6 hours AS NEEDED for pain  *   You may take Tylenol (acetaminophen) in addition to your pain medication. This is over the counter. Please follow the instructions on the bottle. BE AWARE - your pain medication (hydrocodone/oxycodone) may already include acetaminophen in it. If it does, you must include this in your daily amount and make sure not take more than 3000 mg per day.     Antibiotics:  None     Therapy: No therapy needed at this time.    Follow-up Appointment: 10-14 days.        Please call the office if you have any questions or concerns regarding your post-operative care.

## 2024-10-10 NOTE — ANESTHESIA PREPROCEDURE EVALUATION
Procedure:  RELEASE CARPAL TUNNEL ENDOSCOPIC VERSUS OPEN (Left: Wrist)    Relevant Problems   ANESTHESIA   (-) History of anesthesia complications      CARDIO   (-) Chest pain   (-) MONREAL (dyspnea on exertion)      GI/HEPATIC   (-) Gastroesophageal reflux disease      /RENAL   (+) Hydroureteronephrosis   (+) UPJ obstruction, acquired      NEURO/PSYCH   (+) Numbness and tingling in both hands      PULMONARY   (-) Shortness of breath   (-) Sleep apnea   (-) URI (upper respiratory infection)      Surgery/Wound/Pain   (+) Paresthesia and pain of both upper extremities        Physical Exam    Airway    Mallampati score: II  TM Distance: >3 FB  Neck ROM: full     Dental       Cardiovascular      Pulmonary      Other Findings  post-pubertal.      Anesthesia Plan  ASA Score- 2     Anesthesia Type- IV sedation with anesthesia with ASA Monitors.         Additional Monitors:     Airway Plan:            Plan Factors-    Chart reviewed. EKG reviewed.  Existing labs reviewed. Patient summary reviewed.                  Induction- intravenous.    Postoperative Plan-         Informed Consent- Anesthetic plan and risks discussed with patient.  I personally reviewed this patient with the CRNA. Discussed and agreed on the Anesthesia Plan with the CRNA..

## 2024-10-11 NOTE — ANESTHESIA POSTPROCEDURE EVALUATION
Post-Op Assessment Note    Last Filed PACU Vitals:  Vitals Value Taken Time   Temp 97.6 °F (36.4 °C) 10/10/24 0900   Pulse 58 10/10/24 0908   /64 10/10/24 0900   Resp 16 10/10/24 0908   SpO2 93 % 10/10/24 0908   Vitals shown include unfiled device data.    Modified Patricia:  Activity: 2 (10/10/2024 10:46 AM)  Respiration: 2 (10/10/2024 10:46 AM)  Circulation: 2 (10/10/2024 10:46 AM)  Consciousness: 2 (10/10/2024 10:46 AM)  Oxygen Saturation: 2 (10/10/2024 10:46 AM)  Modified Patricia Score: 10 (10/10/2024 10:46 AM)

## 2024-10-22 ENCOUNTER — OFFICE VISIT (OUTPATIENT)
Dept: OBGYN CLINIC | Facility: CLINIC | Age: 54
End: 2024-10-22

## 2024-10-22 VITALS — BODY MASS INDEX: 27.49 KG/M2 | WEIGHT: 165 LBS | HEIGHT: 65 IN

## 2024-10-22 DIAGNOSIS — Z98.890 POSTOPERATIVE STATE: Primary | ICD-10-CM

## 2024-10-22 PROCEDURE — 99024 POSTOP FOLLOW-UP VISIT: CPT | Performed by: STUDENT IN AN ORGANIZED HEALTH CARE EDUCATION/TRAINING PROGRAM

## 2024-10-22 NOTE — PROGRESS NOTES
Assessment/Plan:  Patient ID: 54 y.o. female   Surgery: Release Carpal Tunnel Endoscopic - Left  Date of Surgery: 10/10/2024    Pt also s/p R ECTR on 9/25/24    Pt overall doing well.  Can proceed with activities as tolerated.  Scar massage discussed.  Pt with some pillar pain - this was discussed today, including normal recovery.  We will just monitor this for now and pt can call if it doesn't improve.       Follow Up:  PRN    To Do Next Visit:  Re-evaluation of current issue      CHIEF COMPLAINT:  Chief Complaint   Patient presents with    Right Hand - Pain, Post-op    Left Thumb - Pain    Right Thumb - Pain         SUBJECTIVE:  Patient is a 54 y.o. year old female who presents for follow up after Release Carpal Tunnel Endoscopic - Left. Pt also s/p R ECTR on 9/25/24. Today patient states her n/t is resolved on both hands. She does have some tightness and discomfort in the palm, more towards the thumb side.     Occupation: Catering business    PHYSICAL EXAMINATION:  General: Well developed and well nourished, alert and oriented x 3, appears comfortable  Psychiatric: Normal    MUSCULOSKELETAL EXAMINATION:  Incision: Clean, dry, intact  Surgery Site: normal, no evidence of infection   Range of Motion: As expected  Neurovascular status: Neuro intact, good cap refill. APB 4/5 on R, 5/5 on L. Sensation intact to radial and ulnar aspect of all digits  Done today: Sutures out      STUDIES REVIEWED:  No new studies to review       PROCEDURES PERFORMED:  Procedures  No Procedures performed today    Scribe Attestation      I,:  Mirella Francois PA-C am acting as a scribe while in the presence of the attending physician.:       I,:  Gato Hadley MD personally performed the services described in this documentation    as scribed in my presence.:

## 2024-10-27 DIAGNOSIS — M79.601 PARESTHESIA AND PAIN OF BOTH UPPER EXTREMITIES: ICD-10-CM

## 2024-10-27 DIAGNOSIS — M25.50 POLYARTHRALGIA: ICD-10-CM

## 2024-10-27 DIAGNOSIS — M79.602 PARESTHESIA AND PAIN OF BOTH UPPER EXTREMITIES: ICD-10-CM

## 2024-10-27 DIAGNOSIS — R20.2 PARESTHESIA AND PAIN OF BOTH UPPER EXTREMITIES: ICD-10-CM

## 2024-10-29 RX ORDER — LANOLIN ALCOHOL/MO/W.PET/CERES
400 CREAM (GRAM) TOPICAL DAILY
Qty: 30 TABLET | Refills: 1 | Status: SHIPPED | OUTPATIENT
Start: 2024-10-29

## 2024-11-01 ENCOUNTER — TELEPHONE (OUTPATIENT)
Dept: RHEUMATOLOGY | Facility: CLINIC | Age: 54
End: 2024-11-01

## 2024-11-07 NOTE — TELEPHONE ENCOUNTER
Called and left message to reschedule 11/6 appt. Can offer [address] for sooner appt and then can have follow up at [place] with Fan or Rubia

## 2024-12-02 DIAGNOSIS — E78.2 MIXED HYPERLIPIDEMIA: ICD-10-CM

## 2024-12-03 RX ORDER — ROSUVASTATIN CALCIUM 5 MG/1
5 TABLET, COATED ORAL DAILY
Qty: 90 TABLET | Refills: 1 | Status: SHIPPED | OUTPATIENT
Start: 2024-12-03

## 2025-01-08 DIAGNOSIS — M79.601 PARESTHESIA AND PAIN OF BOTH UPPER EXTREMITIES: ICD-10-CM

## 2025-01-08 DIAGNOSIS — M79.602 PARESTHESIA AND PAIN OF BOTH UPPER EXTREMITIES: ICD-10-CM

## 2025-01-08 DIAGNOSIS — R20.2 PARESTHESIA AND PAIN OF BOTH UPPER EXTREMITIES: ICD-10-CM

## 2025-01-08 DIAGNOSIS — M25.50 POLYARTHRALGIA: ICD-10-CM

## 2025-01-08 RX ORDER — LANOLIN ALCOHOL/MO/W.PET/CERES
400 CREAM (GRAM) TOPICAL DAILY
Qty: 30 TABLET | Refills: 1 | Status: SHIPPED | OUTPATIENT
Start: 2025-01-08

## 2025-01-08 NOTE — TELEPHONE ENCOUNTER
Patient has multiple abnormal labs and was suppose to follow up on 9/30 -but I guess it was never rescheduled, please schedule

## 2025-03-19 DIAGNOSIS — E55.9 VITAMIN D DEFICIENCY: ICD-10-CM

## 2025-03-24 RX ORDER — ERGOCALCIFEROL 1.25 MG/1
CAPSULE, LIQUID FILLED ORAL
Qty: 12 CAPSULE | Refills: 0 | Status: SHIPPED | OUTPATIENT
Start: 2025-03-24

## 2025-03-28 DIAGNOSIS — M79.601 PARESTHESIA AND PAIN OF BOTH UPPER EXTREMITIES: ICD-10-CM

## 2025-03-28 DIAGNOSIS — R20.2 PARESTHESIA AND PAIN OF BOTH UPPER EXTREMITIES: ICD-10-CM

## 2025-03-28 DIAGNOSIS — M79.602 PARESTHESIA AND PAIN OF BOTH UPPER EXTREMITIES: ICD-10-CM

## 2025-03-28 DIAGNOSIS — M25.50 POLYARTHRALGIA: ICD-10-CM

## 2025-03-28 RX ORDER — LANOLIN ALCOHOL/MO/W.PET/CERES
400 CREAM (GRAM) TOPICAL DAILY
Qty: 30 TABLET | Refills: 1 | Status: SHIPPED | OUTPATIENT
Start: 2025-03-28

## 2025-05-06 ENCOUNTER — RA CDI HCC (OUTPATIENT)
Dept: OTHER | Facility: HOSPITAL | Age: 55
End: 2025-05-06

## 2025-05-12 ENCOUNTER — OFFICE VISIT (OUTPATIENT)
Dept: FAMILY MEDICINE CLINIC | Facility: CLINIC | Age: 55
End: 2025-05-12
Payer: COMMERCIAL

## 2025-05-12 VITALS
HEART RATE: 66 BPM | DIASTOLIC BLOOD PRESSURE: 66 MMHG | RESPIRATION RATE: 18 BRPM | HEIGHT: 65 IN | TEMPERATURE: 97.3 F | BODY MASS INDEX: 27.36 KG/M2 | WEIGHT: 164.2 LBS | OXYGEN SATURATION: 97 % | SYSTOLIC BLOOD PRESSURE: 106 MMHG

## 2025-05-12 DIAGNOSIS — E78.2 MIXED HYPERLIPIDEMIA: ICD-10-CM

## 2025-05-12 DIAGNOSIS — F33.1 MODERATE RECURRENT MAJOR DEPRESSION (HCC): ICD-10-CM

## 2025-05-12 DIAGNOSIS — L24.9 IRRITANT DERMATITIS: ICD-10-CM

## 2025-05-12 DIAGNOSIS — Z13.29 THYROID DISORDER SCREENING: ICD-10-CM

## 2025-05-12 DIAGNOSIS — Z12.31 ENCOUNTER FOR SCREENING MAMMOGRAM FOR BREAST CANCER: ICD-10-CM

## 2025-05-12 DIAGNOSIS — Z13.6 SCREENING FOR CARDIOVASCULAR CONDITION: ICD-10-CM

## 2025-05-12 DIAGNOSIS — D17.30 SUBCUTANEOUS LIPOMA: ICD-10-CM

## 2025-05-12 DIAGNOSIS — R09.82 POST-NASAL DRIP: ICD-10-CM

## 2025-05-12 DIAGNOSIS — Z00.01 ENCOUNTER FOR GENERAL ADULT MEDICAL EXAMINATION WITH ABNORMAL FINDINGS: Primary | ICD-10-CM

## 2025-05-12 DIAGNOSIS — N95.1 VASOMOTOR SYMPTOMS DUE TO MENOPAUSE: ICD-10-CM

## 2025-05-12 DIAGNOSIS — Z13.1 SCREENING FOR DIABETES MELLITUS: ICD-10-CM

## 2025-05-12 PROBLEM — R20.0 NUMBNESS AND TINGLING IN BOTH HANDS: Status: RESOLVED | Noted: 2023-06-01 | Resolved: 2025-05-12

## 2025-05-12 PROBLEM — R20.2 NUMBNESS AND TINGLING IN BOTH HANDS: Status: RESOLVED | Noted: 2023-06-01 | Resolved: 2025-05-12

## 2025-05-12 PROBLEM — M79.602 PARESTHESIA AND PAIN OF BOTH UPPER EXTREMITIES: Status: RESOLVED | Noted: 2024-09-04 | Resolved: 2025-05-12

## 2025-05-12 PROBLEM — N13.5 UPJ OBSTRUCTION, ACQUIRED: Status: RESOLVED | Noted: 2017-05-26 | Resolved: 2025-05-12

## 2025-05-12 PROBLEM — R20.2 PARESTHESIA AND PAIN OF BOTH UPPER EXTREMITIES: Status: RESOLVED | Noted: 2024-09-04 | Resolved: 2025-05-12

## 2025-05-12 PROBLEM — M79.601 PARESTHESIA AND PAIN OF BOTH UPPER EXTREMITIES: Status: RESOLVED | Noted: 2024-09-04 | Resolved: 2025-05-12

## 2025-05-12 PROBLEM — N13.30 HYDROURETERONEPHROSIS: Status: RESOLVED | Noted: 2017-04-28 | Resolved: 2025-05-12

## 2025-05-12 PROCEDURE — 99214 OFFICE O/P EST MOD 30 MIN: CPT | Performed by: FAMILY MEDICINE

## 2025-05-12 PROCEDURE — 99396 PREV VISIT EST AGE 40-64: CPT | Performed by: FAMILY MEDICINE

## 2025-05-12 RX ORDER — TRIAMCINOLONE ACETONIDE 1 MG/G
CREAM TOPICAL 2 TIMES DAILY
Qty: 80 G | Refills: 0 | Status: SHIPPED | OUTPATIENT
Start: 2025-05-12

## 2025-05-12 RX ORDER — CRANBERRY FRUIT EXTRACT 650 MG
CAPSULE ORAL
COMMUNITY

## 2025-05-12 RX ORDER — ROSUVASTATIN CALCIUM 5 MG/1
5 TABLET, COATED ORAL DAILY
Qty: 90 TABLET | Refills: 1 | Status: SHIPPED | OUTPATIENT
Start: 2025-05-12

## 2025-05-12 RX ORDER — IPRATROPIUM BROMIDE 42 UG/1
2 SPRAY, METERED NASAL 4 TIMES DAILY
Qty: 15 ML | Refills: 0 | Status: SHIPPED | OUTPATIENT
Start: 2025-05-12

## 2025-05-12 NOTE — PROGRESS NOTES
Adult Annual Physical  Name: Roxana Huffman      : 1970      MRN: [id number]  Encounter Provider: Flaquita Chilel MD  Encounter Date: 2025   Encounter department: [facility]    :  Assessment & Plan  Encounter for general adult medical examination with abnormal findings    Orders:    CBC and differential; Future    Comprehensive metabolic panel; Future    Hemoglobin A1C; Future    Lipid panel; Future    TSH, 3rd generation with Free T4 reflex; Future    Encounter for screening mammogram for breast cancer    Orders:    Mammo screening bilateral w 3d and cad; Future    Moderate recurrent major depression (HCC)  Depression Screening Follow-up Plan: Patient's depression screening was positive with a PHQ-2 score of 3. Their PHQ-9 score was 11. Patient assessed for underlying major depression. They have no active suicidal ideations. Brief counseling provided and recommend additional follow-up/re-evaluation next office visit.    Orders:    Ambulatory referral to Psych Services; Future    Screening for diabetes mellitus    Orders:    Hemoglobin A1C; Future    Screening for cardiovascular condition    Orders:    Lipid panel; Future    Thyroid disorder screening    Orders:    TSH, 3rd generation with Free T4 reflex; Future    Irritant dermatitis    Orders:    triamcinolone (KENALOG) 0.1 % cream; Apply topically 2 (two) times a day    Post-nasal drip    Orders:    ipratropium (ATROVENT) 0.06 % nasal spray; 2 sprays into each nostril 4 (four) times a day    Vasomotor symptoms due to menopause  Now on the supplement       Mixed hyperlipidemia  Continue rosuvastatin, check LFTs  Orders:    rosuvastatin (CRESTOR) 5 mg tablet; Take 1 tablet (5 mg total) by mouth daily    Subcutaneous lipoma  Likely benign, notify me if it increases in size.       Encounter for screening mammogram for breast cancer         Moderate recurrent major depression (HCC)  Depression Screening Follow-up Plan:  Patient's depression screening was positive with a PHQ-2 score of 3. Their PHQ-9 score was 11. Patient assessed for underlying major depression. They have no active suicidal ideations. Brief counseling provided and recommend additional follow-up/re-evaluation next office visit.       Annual physical exam         Screening for diabetes mellitus         Screening for cardiovascular condition         Thyroid disorder screening               Preventive Screenings:  - Diabetes Screening: screening up-to-date and orders placed  - Cholesterol Screening: screening not indicated, has hyperlipidemia and orders placed   - Hepatitis C screening: screening up-to-date   - HIV screening: patient declines   - Cervical cancer screening: patient declines   - Breast cancer screening: screening up-to-date   - Colon cancer screening: screening up-to-date   - Lung cancer screening: screening not indicated       Depression Screening and Follow-up Plan: Patient's depression screening was positive with a PHQ-2 score of 3. Their PHQ-9 score was 11.   Patient assessed for underlying major depression. Brief counseling provided and recommend additional follow-up/re-evaluation next office visit. She would not like any medications however is willing to talk to therapist the referral placed        History of Present Illness     Adult Annual Physical:  Patient presents for annual physical. Presents for annual physical and follow-up of hyperlipidemia, on rosuvastatin.  She also has multiple skin nodules which have been stable which she would like me to check.  She has a history of lipoma.  These are new.  Nontender nonpainful.     Diet and Physical Activity:  - Diet/Nutrition: well balanced diet and consuming 3-5 servings of fruits/vegetables daily.  - Exercise: moderate cardiovascular exercise.    Depression Screening:  - PHQ-2 Score: 3  - PHQ-9 Score: 11    General Health:  - Sleep: sleeps well.  - Hearing: normal hearing bilateral ears.  -  Vision: goes for regular eye exams.  - Dental: regular dental visits.    /GYN Health:  - Follows with GYN: yes.     Review of Systems   Constitutional:  Negative for fatigue and fever.   HENT:  Negative for congestion, facial swelling, mouth sores, rhinorrhea, sore throat and trouble swallowing.    Eyes:  Negative for pain and redness.   Respiratory:  Negative for cough, shortness of breath and wheezing.    Cardiovascular:  Negative for chest pain, palpitations and leg swelling.   Gastrointestinal:  Negative for abdominal pain, blood in stool, constipation, diarrhea and nausea.   Genitourinary:  Negative for dysuria, hematuria and urgency.   Musculoskeletal:  Negative for arthralgias, back pain and myalgias.   Skin:  Negative for rash and wound.        Multiple sub-cutaneous nodules   Neurological:  Negative for seizures, syncope and headaches.   Hematological:  Negative for adenopathy.   Psychiatric/Behavioral:  Negative for agitation and behavioral problems.      Medical History Reviewed by provider this encounter:  Tobacco  Allergies  Meds  Problems  Med Hx  Surg Hx  Fam Hx     .  Current Outpatient Medications on File Prior to Visit   Medication Sig Dispense Refill    clotrimazole (LOTRIMIN) 1 % cream Apply topically 2 (two) times a day (Patient taking differently: Apply topically if needed) 45 g 0    Cyanocobalamin (B-12) 1000 MCG SUBL Place 1 tablet (1,000 mcg total) under the tongue daily 90 tablet 1    DHEA 25 MG CAPS Take by mouth      ergocalciferol (VITAMIN D2) 50,000 units TAKE 1 CAPSULE BY MOUTH ONE TIME PER WEEK 12 capsule 0    Multiple Vitamin (Multivitamin Adult) TABS Take 1 capsule by mouth daily      nystatin (MYCOSTATIN) powder Apply topically 3 (three) times a day (Patient taking differently: Apply topically if needed) 60 g 2    [DISCONTINUED] rosuvastatin (CRESTOR) 5 mg tablet Take 1 tablet (5 mg total) by mouth daily 90 tablet 1    [DISCONTINUED] ibuprofen (MOTRIN) 800 mg tablet Take 1  "tablet (800 mg total) by mouth every 8 (eight) hours as needed for mild pain or moderate pain (Patient not taking: Reported on 10/8/2024) 30 tablet 0    [DISCONTINUED] magnesium Oxide (MAG-OX) 400 mg TABS TAKE 1 TABLET BY MOUTH EVERY DAY (Patient not taking: Reported on 2025) 30 tablet 1     No current facility-administered medications on file prior to visit.      Social History     Tobacco Use    Smoking status: Former     Current packs/day: 0.00     Average packs/day: 0.5 packs/day for 20.0 years (10.0 ttl pk-yrs)     Types: Cigarettes     Start date: 1993     Quit date: 2013     Years since quittin.3    Smokeless tobacco: Never    Tobacco comments:     stopped 5 years   Vaping Use    Vaping status: Never Used   Substance and Sexual Activity    Alcohol use: Yes     Alcohol/week: 6.0 standard drinks of alcohol     Types: 6 Glasses of wine per week    Drug use: Not Currently     Types: Marijuana    Sexual activity: Yes     Partners: Male     Birth control/protection: Female Sterilization       Objective   /66 (BP Location: Right arm, Patient Position: Sitting, Cuff Size: Adult)   Pulse 66   Temp (!) 97.3 °F (36.3 °C) (Tympanic)   Resp 18   Ht 5' 5\" (1.651 m)   Wt 74.5 kg (164 lb 3.2 oz)   SpO2 97%   BMI 27.32 kg/m²     Physical Exam  Vitals and nursing note reviewed.   Constitutional:       Appearance: She is well-developed.   HENT:      Head: Normocephalic and atraumatic.      Right Ear: Tympanic membrane, ear canal and external ear normal.      Left Ear: Tympanic membrane, ear canal and external ear normal.      Nose: Nose normal.      Mouth/Throat:      Pharynx: No oropharyngeal exudate.   Eyes:      General: No scleral icterus.        Right eye: No discharge.         Left eye: No discharge.      Conjunctiva/sclera: Conjunctivae normal.      Pupils: Pupils are equal, round, and reactive to light.   Neck:      Thyroid: No thyromegaly.   Cardiovascular:      Rate and Rhythm: Normal " rate and regular rhythm.      Heart sounds: No murmur heard.     No gallop.   Pulmonary:      Effort: Pulmonary effort is normal. No respiratory distress.      Breath sounds: Normal breath sounds. No wheezing or rales.   Abdominal:      Palpations: Abdomen is soft.      Tenderness: There is no abdominal tenderness.   Musculoskeletal:         General: Tenderness (right lateral epicondyle) present. No deformity.      Cervical back: Normal range of motion.      Right lower leg: No edema.      Left lower leg: No edema.   Lymphadenopathy:      Cervical: No cervical adenopathy.   Skin:     General: Skin is warm.      Capillary Refill: Capillary refill takes less than 2 seconds.      Findings: Lesion (small cystic lesion left anterior wrist) present. No erythema or rash.   Neurological:      Mental Status: She is alert and oriented to person, place, and time.      Deep Tendon Reflexes: Reflexes normal.   Psychiatric:         Behavior: Behavior normal.         Thought Content: Thought content normal.         Judgment: Judgment normal.

## 2025-05-12 NOTE — PATIENT INSTRUCTIONS
"Patient Education     Routine physical for adults   The Basics   Written by the doctors and editors at [facility]   What is a physical? -- A physical is a routine visit, or \"check-up,\" with your doctor. You might also hear it called a \"wellness visit\" or \"preventive visit.\"  During each visit, the doctor will:   Ask about your physical and mental health   Ask about your habits, behaviors, and lifestyle   Do an exam   Give you vaccines if needed   Talk to you about any medicines you take   Give advice about your health   Answer your questions  Getting regular check-ups is an important part of taking care of your health. It can help your doctor find and treat any problems you have. But it's also important for preventing health problems.  A routine physical is different from a \"sick visit.\" A sick visit is when you see a doctor because of a health concern or problem. Since physicals are scheduled ahead of time, you can think about what you want to ask the doctor.  How often should I get a physical? -- It depends on your age and health. In general, for people age 21 years and older:   If you are younger than 50 years, you might be able to get a physical every 3 years.   If you are 50 years or older, your doctor might recommend a physical every year.  If you have an ongoing health condition, like diabetes or high blood pressure, your doctor will probably want to see you more often.  What happens during a physical? -- In general, each visit will include:   Physical exam - The doctor or nurse will check your height, weight, heart rate, and blood pressure. They will also look at your eyes and ears. They will ask about how you are feeling and whether you have any symptoms that bother you.   Medicines - It's a good idea to bring a list of all the medicines you take to each doctor visit. Your doctor will talk to you about your medicines and answer any questions. Tell them if you are having any side effects that bother you. You " "should also tell them if you are having trouble paying for any of your medicines.   Habits and behaviors - This includes:   Your diet   Your exercise habits   Whether you smoke, drink alcohol, or use drugs   Whether you are sexually active   Whether you feel safe at home  Your doctor will talk to you about things you can do to improve your health and lower your risk of health problems. They will also offer help and support. For example, if you want to quit smoking, they can give you advice and might prescribe medicines. If you want to improve your diet or get more physical activity, they can help you with this, too.   Lab tests, if needed - The tests you get will depend on your age and situation. For example, your doctor might want to check your:   Cholesterol   Blood sugar   Iron level   Vaccines - The recommended vaccines will depend on your age, health, and what vaccines you already had. Vaccines are very important because they can prevent certain serious or deadly infections.   Discussion of screening - \"Screening\" means checking for diseases or other health problems before they cause symptoms. Your doctor can recommend screening based on your age, risk, and preferences. This might include tests to check for:   Cancer, such as breast, prostate, cervical, ovarian, colorectal, prostate, lung, or skin cancer   Sexually transmitted infections, such as chlamydia and gonorrhea   Mental health conditions like depression and anxiety  Your doctor will talk to you about the different types of screening tests. They can help you decide which screenings to have. They can also explain what the results might mean.   Answering questions - The physical is a good time to ask the doctor or nurse questions about your health. If needed, they can refer you to other doctors or specialists, too.  Adults older than 65 years often need other care, too. As you get older, your doctor will talk to you about:   How to prevent falling at " home   Hearing or vision tests   Memory testing   How to take your medicines safely   Making sure that you have the help and support you need at home  All topics are updated as new evidence becomes available and our peer review process is complete.  This topic retrieved from [employer] on: May 02, 2024.  Topic 619583 Version 1.0  Release: 32.4.3 - C32.122  © 2024 [employer] and/or its affiliates. All rights reserved.  Consumer Information Use and Disclaimer   Disclaimer: This generalized information is a limited summary of diagnosis, treatment, and/or medication information. It is not meant to be comprehensive and should be used as a tool to help the user understand and/or assess potential diagnostic and treatment options. It does NOT include all information about conditions, treatments, medications, side effects, or risks that may apply to a specific patient. It is not intended to be medical advice or a substitute for the medical advice, diagnosis, or treatment of a health care provider based on the health care provider's examination and assessment of a patient's specific and unique circumstances. Patients must speak with a health care provider for complete information about their health, medical questions, and treatment options, including any risks or benefits regarding use of medications. This information does not endorse any treatments or medications as safe, effective, or approved for treating a specific patient. [employer] and its affiliates disclaim any warranty or liability relating to this information or the use thereof.The use of this information is governed by the Terms of Use, available at https://www.w[employer]tersAppLabsuwer.com/en/know/clinical-effectiveness-terms. 2024© [employer] and its affiliates and/or licensors. All rights reserved.  Copyright   © 2024 [employer] and/or its affiliates. All rights reserved.

## 2025-05-27 ENCOUNTER — TELEPHONE (OUTPATIENT)
Age: 55
End: 2025-05-27

## 2025-05-27 NOTE — TELEPHONE ENCOUNTER
Contacted patient in regards to Routine Referral in attempts to verify patient's needs of services and add patient to proper wait list. LVM for patient to contact intake dept  in regards to routine referral at [phone]. 2nd attempt closing referral.

## 2025-06-18 ENCOUNTER — APPOINTMENT (OUTPATIENT)
Dept: LAB | Facility: HOSPITAL | Age: 55
End: 2025-06-18
Attending: FAMILY MEDICINE
Payer: COMMERCIAL

## 2025-06-18 ENCOUNTER — HOSPITAL ENCOUNTER (OUTPATIENT)
Facility: HOSPITAL | Age: 55
Discharge: HOME | End: 2025-06-18
Attending: FAMILY MEDICINE
Payer: COMMERCIAL

## 2025-06-18 VITALS — HEIGHT: 65 IN | BODY MASS INDEX: 27.32 KG/M2 | WEIGHT: 164 LBS

## 2025-06-18 DIAGNOSIS — Z13.1 SCREENING FOR DIABETES MELLITUS: ICD-10-CM

## 2025-06-18 DIAGNOSIS — Z13.29 THYROID DISORDER SCREENING: ICD-10-CM

## 2025-06-18 DIAGNOSIS — Z12.31 ENCOUNTER FOR SCREENING MAMMOGRAM FOR BREAST CANCER: ICD-10-CM

## 2025-06-18 DIAGNOSIS — Z13.6 SCREENING FOR CARDIOVASCULAR CONDITION: ICD-10-CM

## 2025-06-18 DIAGNOSIS — Z00.01 ENCOUNTER FOR GENERAL ADULT MEDICAL EXAMINATION WITH ABNORMAL FINDINGS: ICD-10-CM

## 2025-06-18 LAB
ALBUMIN SERPL BCG-MCNC: 4.5 G/DL (ref 3.5–5)
ALP SERPL-CCNC: 63 U/L (ref 34–104)
ALT SERPL W P-5'-P-CCNC: 19 U/L (ref 7–52)
ANION GAP SERPL CALCULATED.3IONS-SCNC: 8 MMOL/L (ref 4–13)
AST SERPL W P-5'-P-CCNC: 23 U/L (ref 13–39)
BASOPHILS # BLD AUTO: 0.07 THOUSANDS/ÂΜL (ref 0–0.1)
BASOPHILS NFR BLD AUTO: 1 % (ref 0–1)
BILIRUB SERPL-MCNC: 0.46 MG/DL (ref 0.2–1)
BUN SERPL-MCNC: 17 MG/DL (ref 5–25)
CALCIUM SERPL-MCNC: 9.4 MG/DL (ref 8.4–10.2)
CHLORIDE SERPL-SCNC: 104 MMOL/L (ref 96–108)
CHOLEST SERPL-MCNC: 249 MG/DL (ref ?–200)
CO2 SERPL-SCNC: 26 MMOL/L (ref 21–32)
CREAT SERPL-MCNC: 0.86 MG/DL (ref 0.6–1.3)
EOSINOPHIL # BLD AUTO: 0.28 THOUSAND/ÂΜL (ref 0–0.61)
EOSINOPHIL NFR BLD AUTO: 6 % (ref 0–6)
ERYTHROCYTE [DISTWIDTH] IN BLOOD BY AUTOMATED COUNT: 12.7 % (ref 11.6–15.1)
EST. AVERAGE GLUCOSE BLD GHB EST-MCNC: 111 MG/DL
GFR SERPL CREATININE-BSD FRML MDRD: 76 ML/MIN/1.73SQ M
GLUCOSE P FAST SERPL-MCNC: 100 MG/DL (ref 65–99)
HBA1C MFR BLD: 5.5 %
HCT VFR BLD AUTO: 42.5 % (ref 34.8–46.1)
HDLC SERPL-MCNC: 67 MG/DL
HGB BLD-MCNC: 14.1 G/DL (ref 11.5–15.4)
IMM GRANULOCYTES # BLD AUTO: 0.01 THOUSAND/UL (ref 0–0.2)
IMM GRANULOCYTES NFR BLD AUTO: 0 % (ref 0–2)
LDLC SERPL CALC-MCNC: 144 MG/DL (ref 0–100)
LYMPHOCYTES # BLD AUTO: 1.99 THOUSANDS/ÂΜL (ref 0.6–4.47)
LYMPHOCYTES NFR BLD AUTO: 40 % (ref 14–44)
MCH RBC QN AUTO: 31.1 PG (ref 26.8–34.3)
MCHC RBC AUTO-ENTMCNC: 33.2 G/DL (ref 31.4–37.4)
MCV RBC AUTO: 94 FL (ref 82–98)
MONOCYTES # BLD AUTO: 0.27 THOUSAND/ÂΜL (ref 0.17–1.22)
MONOCYTES NFR BLD AUTO: 5 % (ref 4–12)
NEUTROPHILS # BLD AUTO: 2.38 THOUSANDS/ÂΜL (ref 1.85–7.62)
NEUTS SEG NFR BLD AUTO: 48 % (ref 43–75)
NONHDLC SERPL-MCNC: 182 MG/DL
NRBC BLD AUTO-RTO: 0 /100 WBCS
PLATELET # BLD AUTO: 220 THOUSANDS/UL (ref 149–390)
PMV BLD AUTO: 10.1 FL (ref 8.9–12.7)
POTASSIUM SERPL-SCNC: 3.9 MMOL/L (ref 3.5–5.3)
PROT SERPL-MCNC: 7 G/DL (ref 6.4–8.4)
RBC # BLD AUTO: 4.54 MILLION/UL (ref 3.81–5.12)
SODIUM SERPL-SCNC: 138 MMOL/L (ref 135–147)
TRIGL SERPL-MCNC: 190 MG/DL (ref ?–150)
TSH SERPL DL<=0.05 MIU/L-ACNC: 2.77 UIU/ML (ref 0.45–4.5)
WBC # BLD AUTO: 5 THOUSAND/UL (ref 4.31–10.16)

## 2025-06-18 PROCEDURE — 80053 COMPREHEN METABOLIC PANEL: CPT

## 2025-06-18 PROCEDURE — 85025 COMPLETE CBC W/AUTO DIFF WBC: CPT

## 2025-06-18 PROCEDURE — 83036 HEMOGLOBIN GLYCOSYLATED A1C: CPT

## 2025-06-18 PROCEDURE — 77063 BREAST TOMOSYNTHESIS BI: CPT

## 2025-06-18 PROCEDURE — 36415 COLL VENOUS BLD VENIPUNCTURE: CPT

## 2025-06-18 PROCEDURE — 80061 LIPID PANEL: CPT

## 2025-06-18 PROCEDURE — 77067 SCR MAMMO BI INCL CAD: CPT

## 2025-06-18 PROCEDURE — 84443 ASSAY THYROID STIM HORMONE: CPT

## 2025-06-19 ENCOUNTER — RESULTS FOLLOW-UP (OUTPATIENT)
Dept: FAMILY MEDICINE CLINIC | Facility: CLINIC | Age: 55
End: 2025-06-19

## 2025-06-19 DIAGNOSIS — E78.2 MIXED HYPERLIPIDEMIA: Primary | ICD-10-CM

## 2025-06-19 RX ORDER — ROSUVASTATIN CALCIUM 10 MG/1
10 TABLET, COATED ORAL DAILY
Qty: 90 TABLET | Refills: 1 | Status: SHIPPED | OUTPATIENT
Start: 2025-06-19

## (undated) DEVICE — ADHESIVE SKN CLSR HISTOACRYL FLEX 0.5ML LF

## (undated) DEVICE — ANTI-FOG SOLUTION WITH FOAM PAD: Brand: DEVON

## (undated) DEVICE — LIGACLIP MCA MULTIPLE CLIP APPLIERS, 20 SMALL CLIPS: Brand: LIGACLIP

## (undated) DEVICE — SYSTEM ENDOSCOPIC CARPAL TUNNEL RELEASE DISP

## (undated) DEVICE — DRAPE SURGIKIT SADDLE BAG

## (undated) DEVICE — BAG URINE DRAINAGE 2000ML ANTI RFLX LF

## (undated) DEVICE — PAD CAST 4 IN COTTON NON STERILE

## (undated) DEVICE — INTENDED FOR TISSUE SEPARATION, AND OTHER PROCEDURES THAT REQUIRE A SHARP SURGICAL BLADE TO PUNCTURE OR CUT.: Brand: BARD-PARKER ® CARBON RIB-BACK BLADES

## (undated) DEVICE — JP PERF DRN SIL FLT 10MM FULL: Brand: CARDINAL HEALTH

## (undated) DEVICE — VESSEL LOOPS X-RAY DETECTABLE: Brand: DEROYAL

## (undated) DEVICE — SUT MONOCRYL 3-0 RB-1 27 IN Y215H

## (undated) DEVICE — GLOVE INDICATOR PI UNDERGLOVE SZ 7 BLUE

## (undated) DEVICE — ENDOSCOPIC VALVE WITH ADAPTER.: Brand: SURSEAL® II

## (undated) DEVICE — ACE WRAP 3 IN UNSTERILE

## (undated) DEVICE — SURGICEL 4 X 8

## (undated) DEVICE — STERILE MAJOR GENERAL PACK: Brand: CARDINAL HEALTH

## (undated) DEVICE — STERILE BETHLEHEM PLASTIC HAND: Brand: CARDINAL HEALTH

## (undated) DEVICE — GAUZE SPONGES,16 PLY: Brand: CURITY

## (undated) DEVICE — SPECIMEN CONTAINER STERILE PEEL PACK

## (undated) DEVICE — JACKSON-PRATT 100CC BULB RESERVOIR: Brand: CARDINAL HEALTH

## (undated) DEVICE — CATH FOLEY 18FR 5ML 2 WAY SILICONE ELASTIMER

## (undated) DEVICE — GLOVE SRG BIOGEL 6.5

## (undated) DEVICE — 3000CC GUARDIAN II: Brand: GUARDIAN

## (undated) DEVICE — ABDOMINAL PAD: Brand: DERMACEA

## (undated) DEVICE — SUT VICRYL 0 UR-6 27 IN J603H

## (undated) DEVICE — SYRINGE CATH TIP 50ML

## (undated) DEVICE — TOWEL SET X-RAY

## (undated) DEVICE — ACE WRAP 4 IN STERILE

## (undated) DEVICE — GUIDEWIRE .038 X 145

## (undated) DEVICE — FLOSEAL MATRIX IS INDICATED IN SURGICAL PROCEDURES (OTHER THAN IN OPHTHALMIC) AS AN ADJUNCT TO HEMOSTASIS WHEN CONTROL OF BLEEDING BY LIGATURE OR CONVENTIONALPROCEDURES IS INEFFECTIVE OR IMPRACTICAL.: Brand: FLOSEAL HEMOSTATIC MATRIX

## (undated) DEVICE — SUT CHROMIC 4-0 P-3 18 MM 1654G

## (undated) DEVICE — GLOVE SRG BIOGEL ECLIPSE 7

## (undated) DEVICE — NEEDLE 25GA X 1 IN SAFETY GLIDE

## (undated) DEVICE — GUIDEWIRE STRGHT TIP 0.035 IN  SOLO PLUS

## (undated) DEVICE — TRAY FOLEY 16FR URIMETER SURESTEP

## (undated) DEVICE — CURITY STRETCH BANDAGE: Brand: CURITY

## (undated) DEVICE — 2000CC GUARDIAN II: Brand: GUARDIAN

## (undated) DEVICE — GLOVE SRG BIOGEL ORTHOPEDIC 7.5

## (undated) DEVICE — SPONGE SCRUB 4 PCT CHLORHEXIDINE

## (undated) DEVICE — INTENDED FOR TISSUE SEPARATION, AND OTHER PROCEDURES THAT REQUIRE A SHARP SURGICAL BLADE TO PUNCTURE OR CUT.: Brand: BARD-PARKER SAFETY BLADES SIZE 10, STERILE

## (undated) DEVICE — CHLORAPREP HI-LITE 26ML ORANGE

## (undated) DEVICE — SUT VICRYL 2-0 SH 27 IN UNDYED J417H

## (undated) DEVICE — CATH URETERAL 5FR X 70 CM FLEX TIP POLYUR BARD

## (undated) DEVICE — 3M™ IOBAN™ 2 ANTIMICROBIAL INCISE DRAPE 6640EZ: Brand: IOBAN™ 2

## (undated) DEVICE — PROXIMATE PLUS MD MULTI-DIRECTIONAL RELEASE SKIN STAPLERS CONTAINS 35 STAINLESS STEEL STAPLES APPROXIMATE CLOSED DIMENSIONS: 6.9MM X 3.9MM WIDE: Brand: PROXIMATE

## (undated) DEVICE — CUFF TOURNIQUET 18 X 4 IN QUICK CONNECT DISP 1 BLADDER

## (undated) DEVICE — MEDI-VAC YANK SUCT HNDL W/TPRD BULBOUS TIP: Brand: CARDINAL HEALTH

## (undated) DEVICE — INTENDED FOR TISSUE SEPARATION, AND OTHER PROCEDURES THAT REQUIRE A SHARP SURGICAL BLADE TO PUNCTURE OR CUT.: Brand: BARD-PARKER SAFETY BLADES SIZE 15, STERILE

## (undated) DEVICE — OCCLUSIVE GAUZE STRIP,3% BISMUTH TRIBROMOPHENATE IN PETROLATUM BLEND: Brand: XEROFORM

## (undated) DEVICE — BASKET STONE RTRVL PLTN CL 3FR 115CM

## (undated) DEVICE — PADDING CAST 4 IN  COTTON STRL

## (undated) DEVICE — ROSEBUD DISSECTORS: Brand: DEROYAL

## (undated) DEVICE — SYRINGE 10ML LL

## (undated) DEVICE — ACE WRAP 3 IN STERILE

## (undated) DEVICE — POOLE SUCTION HANDLE W/TUBING: Brand: CARDINAL HEALTH

## (undated) DEVICE — SUT PDS II 1 CTX 36 IN Z371T

## (undated) DEVICE — PACK TUR